# Patient Record
Sex: FEMALE | Race: WHITE | NOT HISPANIC OR LATINO | URBAN - METROPOLITAN AREA
[De-identification: names, ages, dates, MRNs, and addresses within clinical notes are randomized per-mention and may not be internally consistent; named-entity substitution may affect disease eponyms.]

---

## 2020-06-24 ENCOUNTER — INPATIENT (INPATIENT)
Facility: HOSPITAL | Age: 50
LOS: 2 days | Discharge: HOME | DRG: 392 | End: 2020-06-27
Attending: COLON & RECTAL SURGERY | Admitting: COLON & RECTAL SURGERY
Payer: COMMERCIAL

## 2020-06-24 VITALS — WEIGHT: 190.04 LBS | HEIGHT: 65 IN

## 2020-06-24 DIAGNOSIS — K57.20 DIVERTICULITIS OF LARGE INTESTINE WITH PERFORATION AND ABSCESS WITHOUT BLEEDING: ICD-10-CM

## 2020-06-24 LAB
ALBUMIN SERPL ELPH-MCNC: 3.1 G/DL — LOW (ref 3.3–5)
ALP SERPL-CCNC: 99 U/L — SIGNIFICANT CHANGE UP (ref 40–120)
ALT FLD-CCNC: 35 U/L — SIGNIFICANT CHANGE UP (ref 12–78)
ANION GAP SERPL CALC-SCNC: 8 MMOL/L — SIGNIFICANT CHANGE UP (ref 5–17)
APPEARANCE UR: CLEAR — SIGNIFICANT CHANGE UP
APTT BLD: 33.1 SEC — SIGNIFICANT CHANGE UP (ref 27.5–36.3)
AST SERPL-CCNC: 14 U/L — LOW (ref 15–37)
BASOPHILS # BLD AUTO: 0.05 K/UL — SIGNIFICANT CHANGE UP (ref 0–0.2)
BASOPHILS NFR BLD AUTO: 0.5 % — SIGNIFICANT CHANGE UP (ref 0–2)
BILIRUB SERPL-MCNC: 0.5 MG/DL — SIGNIFICANT CHANGE UP (ref 0.2–1.2)
BILIRUB UR-MCNC: NEGATIVE — SIGNIFICANT CHANGE UP
BUN SERPL-MCNC: 14 MG/DL — SIGNIFICANT CHANGE UP (ref 7–23)
CALCIUM SERPL-MCNC: 9.5 MG/DL — SIGNIFICANT CHANGE UP (ref 8.5–10.1)
CHLORIDE SERPL-SCNC: 105 MMOL/L — SIGNIFICANT CHANGE UP (ref 96–108)
CO2 SERPL-SCNC: 24 MMOL/L — SIGNIFICANT CHANGE UP (ref 22–31)
COLOR SPEC: YELLOW — SIGNIFICANT CHANGE UP
CREAT SERPL-MCNC: 0.62 MG/DL — SIGNIFICANT CHANGE UP (ref 0.5–1.3)
DIFF PNL FLD: ABNORMAL
EOSINOPHIL # BLD AUTO: 0.13 K/UL — SIGNIFICANT CHANGE UP (ref 0–0.5)
EOSINOPHIL NFR BLD AUTO: 1.3 % — SIGNIFICANT CHANGE UP (ref 0–6)
GLUCOSE SERPL-MCNC: 101 MG/DL — HIGH (ref 70–99)
GLUCOSE UR QL: NEGATIVE MG/DL — SIGNIFICANT CHANGE UP
HCT VFR BLD CALC: 36.3 % — SIGNIFICANT CHANGE UP (ref 34.5–45)
HGB BLD-MCNC: 12.2 G/DL — SIGNIFICANT CHANGE UP (ref 11.5–15.5)
IMM GRANULOCYTES NFR BLD AUTO: 0.4 % — SIGNIFICANT CHANGE UP (ref 0–1.5)
INR BLD: 1.19 RATIO — HIGH (ref 0.88–1.16)
KETONES UR-MCNC: ABNORMAL
LACTATE SERPL-SCNC: 0.9 MMOL/L — SIGNIFICANT CHANGE UP (ref 0.7–2)
LEUKOCYTE ESTERASE UR-ACNC: ABNORMAL
LYMPHOCYTES # BLD AUTO: 2.2 K/UL — SIGNIFICANT CHANGE UP (ref 1–3.3)
LYMPHOCYTES # BLD AUTO: 21.6 % — SIGNIFICANT CHANGE UP (ref 13–44)
MCHC RBC-ENTMCNC: 29.3 PG — SIGNIFICANT CHANGE UP (ref 27–34)
MCHC RBC-ENTMCNC: 33.6 GM/DL — SIGNIFICANT CHANGE UP (ref 32–36)
MCV RBC AUTO: 87.3 FL — SIGNIFICANT CHANGE UP (ref 80–100)
MONOCYTES # BLD AUTO: 0.73 K/UL — SIGNIFICANT CHANGE UP (ref 0–0.9)
MONOCYTES NFR BLD AUTO: 7.2 % — SIGNIFICANT CHANGE UP (ref 2–14)
NEUTROPHILS # BLD AUTO: 7.03 K/UL — SIGNIFICANT CHANGE UP (ref 1.8–7.4)
NEUTROPHILS NFR BLD AUTO: 69 % — SIGNIFICANT CHANGE UP (ref 43–77)
NITRITE UR-MCNC: NEGATIVE — SIGNIFICANT CHANGE UP
PH UR: 5 — SIGNIFICANT CHANGE UP (ref 5–8)
PLATELET # BLD AUTO: 314 K/UL — SIGNIFICANT CHANGE UP (ref 150–400)
POTASSIUM SERPL-MCNC: 3.6 MMOL/L — SIGNIFICANT CHANGE UP (ref 3.5–5.3)
POTASSIUM SERPL-SCNC: 3.6 MMOL/L — SIGNIFICANT CHANGE UP (ref 3.5–5.3)
PROT SERPL-MCNC: 8.5 GM/DL — HIGH (ref 6–8.3)
PROT UR-MCNC: 30 MG/DL
PROTHROM AB SERPL-ACNC: 13.3 SEC — HIGH (ref 10–12.9)
RBC # BLD: 4.16 M/UL — SIGNIFICANT CHANGE UP (ref 3.8–5.2)
RBC # FLD: 13.6 % — SIGNIFICANT CHANGE UP (ref 10.3–14.5)
SODIUM SERPL-SCNC: 137 MMOL/L — SIGNIFICANT CHANGE UP (ref 135–145)
SP GR SPEC: 1.02 — SIGNIFICANT CHANGE UP (ref 1.01–1.02)
UROBILINOGEN FLD QL: 4 MG/DL
WBC # BLD: 10.18 K/UL — SIGNIFICANT CHANGE UP (ref 3.8–10.5)
WBC # FLD AUTO: 10.18 K/UL — SIGNIFICANT CHANGE UP (ref 3.8–10.5)

## 2020-06-24 PROCEDURE — 84100 ASSAY OF PHOSPHORUS: CPT

## 2020-06-24 PROCEDURE — 36415 COLL VENOUS BLD VENIPUNCTURE: CPT

## 2020-06-24 PROCEDURE — 87635 SARS-COV-2 COVID-19 AMP PRB: CPT

## 2020-06-24 PROCEDURE — 93010 ELECTROCARDIOGRAM REPORT: CPT

## 2020-06-24 PROCEDURE — 71045 X-RAY EXAM CHEST 1 VIEW: CPT | Mod: 26

## 2020-06-24 PROCEDURE — 85730 THROMBOPLASTIN TIME PARTIAL: CPT

## 2020-06-24 PROCEDURE — 99222 1ST HOSP IP/OBS MODERATE 55: CPT | Mod: AI,GC

## 2020-06-24 PROCEDURE — 85610 PROTHROMBIN TIME: CPT

## 2020-06-24 PROCEDURE — 85027 COMPLETE CBC AUTOMATED: CPT

## 2020-06-24 PROCEDURE — 80048 BASIC METABOLIC PNL TOTAL CA: CPT

## 2020-06-24 PROCEDURE — 85025 COMPLETE CBC W/AUTO DIFF WBC: CPT

## 2020-06-24 PROCEDURE — 83735 ASSAY OF MAGNESIUM: CPT

## 2020-06-24 RX ORDER — HEPARIN SODIUM 5000 [USP'U]/ML
5000 INJECTION INTRAVENOUS; SUBCUTANEOUS EVERY 8 HOURS
Refills: 0 | Status: DISCONTINUED | OUTPATIENT
Start: 2020-06-24 | End: 2020-06-27

## 2020-06-24 RX ORDER — SODIUM CHLORIDE 9 MG/ML
1000 INJECTION, SOLUTION INTRAVENOUS
Refills: 0 | Status: DISCONTINUED | OUTPATIENT
Start: 2020-06-24 | End: 2020-06-26

## 2020-06-24 RX ORDER — ONDANSETRON 8 MG/1
4 TABLET, FILM COATED ORAL ONCE
Refills: 0 | Status: DISCONTINUED | OUTPATIENT
Start: 2020-06-24 | End: 2020-06-27

## 2020-06-24 RX ORDER — MORPHINE SULFATE 50 MG/1
2 CAPSULE, EXTENDED RELEASE ORAL EVERY 6 HOURS
Refills: 0 | Status: DISCONTINUED | OUTPATIENT
Start: 2020-06-24 | End: 2020-06-27

## 2020-06-24 RX ORDER — PIPERACILLIN AND TAZOBACTAM 4; .5 G/20ML; G/20ML
3.38 INJECTION, POWDER, LYOPHILIZED, FOR SOLUTION INTRAVENOUS ONCE
Refills: 0 | Status: COMPLETED | OUTPATIENT
Start: 2020-06-24 | End: 2020-06-24

## 2020-06-24 RX ORDER — SODIUM CHLORIDE 9 MG/ML
1000 INJECTION INTRAMUSCULAR; INTRAVENOUS; SUBCUTANEOUS ONCE
Refills: 0 | Status: COMPLETED | OUTPATIENT
Start: 2020-06-24 | End: 2020-06-24

## 2020-06-24 RX ORDER — PIPERACILLIN AND TAZOBACTAM 4; .5 G/20ML; G/20ML
3.38 INJECTION, POWDER, LYOPHILIZED, FOR SOLUTION INTRAVENOUS EVERY 8 HOURS
Refills: 0 | Status: DISCONTINUED | OUTPATIENT
Start: 2020-06-24 | End: 2020-06-27

## 2020-06-24 RX ORDER — KETOROLAC TROMETHAMINE 30 MG/ML
15 SYRINGE (ML) INJECTION ONCE
Refills: 0 | Status: DISCONTINUED | OUTPATIENT
Start: 2020-06-24 | End: 2020-06-27

## 2020-06-24 RX ADMIN — PIPERACILLIN AND TAZOBACTAM 200 GRAM(S): 4; .5 INJECTION, POWDER, LYOPHILIZED, FOR SOLUTION INTRAVENOUS at 21:18

## 2020-06-24 RX ADMIN — SODIUM CHLORIDE 1000 MILLILITER(S): 9 INJECTION INTRAMUSCULAR; INTRAVENOUS; SUBCUTANEOUS at 22:05

## 2020-06-24 NOTE — ED ADULT NURSE NOTE - OBJECTIVE STATEMENT
Patient presents with abdominal pain for the past few months, patient reports it has worsened over the past week. Patient denies fever reports + constipation. Patient states she was at [facility] and had imaging prior to being sent to ED for further evaluation. Patient states she was told she has sigmoid diverticulitis with abscess and fistula.

## 2020-06-24 NOTE — H&P ADULT - ATTENDING COMMENTS
Seen and examined.  First episode of diverticulitis. No prior colonoscopy.  Outpt CT imaging with findings of moderate to severe sigmoid diverticulitis 2.5cm  abscess with impingement of left posterior aspect of bladder wall. though no definitive fistula identified.  WBC 10.  Afebrile.  soft, tender LLQ, ND  Remainder of labwork reviewed  A/P - Sigmoid diverticulitis with pericolic abscess and impending CVF formation  Admit, NPO, IVF, Abx  Reassess abdominal exam in AM prior to diet advancement.

## 2020-06-24 NOTE — H&P ADULT - NSHPPHYSICALEXAM_GEN_ALL_CORE
PHYSICAL EXAM:  Constitutional: NAD, GCS: 15/15  AOX3  Eyes:  WNL  ENMT:  WNL  Neck:  WNL, non tender  Back: Non tender  Respiratory: CTABL  Cardiovascular:  S1+S2+0  Gastrointestinal: Soft,tender to palpation in left lower quadrant, no rgr  Genitourinary:  WNL  Extremities: NV intact  Vascular:  Intact  Neurological: No focal neurological deficit,  CN, motor and sensory system grossly intact.  Skin: WNL  Musculoskeletal: WNL  Psychiatric: Grossly WNL                          12.2   10.18 )-----------( 314      ( 24 Jun 2020 21:14 )             36.3     06-24    137  |  105  |  14  ----------------------------<  101<H>  3.6   |  24  |  0.62    Ca    9.5      24 Jun 2020 21:14    TPro  8.5<H>  /  Alb  3.1<L>  /  TBili  0.5  /  DBili  x   /  AST  14<L>  /  ALT  35  /  AlkPhos  99  06-24

## 2020-06-24 NOTE — ED STATDOCS - PROGRESS NOTE DETAILS
called surgery resident for colorectal consult, will come see pt  Lovely Javed PA-C Pt seen and examined by surgery resident, will admit to Dr. Marie England, pt agreeable to being admitted and plan of care, COVID swab sent  Lovely Javed PA-C

## 2020-06-24 NOTE — H&P ADULT - HISTORY OF PRESENT ILLNESS
Pt is a 49 y/o female with no past medical history who presents to the ED c/o LLQ abdominal pain. Pt reports intermittent episodes of pain has been going on for months no with pain for the past 6 days with minimal improvement.  Pt went to [facility] MD and medical arts PTA for imaging with +sigmoid diverticulitis with abscess formation and colovesical fistula. Pt also found to have UTI. Denies n/v/d/pneumoturia. No other complaints at this time. Allergies: Cipro. PCP: Dr. Mishra. Gynecology: Dr. Castro, no history of colonoscopy in past    Pmed: none  Psurg: Cholecystectomy, Partial hysterectomy (2012), Myomectomy (2010)  Social: Denies smoking, etoh, drug abuse

## 2020-06-24 NOTE — ED STATDOCS - OBJECTIVE STATEMENT
49 y/o female with a PMHx of cholecystectomy presents to the ED c/o LLQ abdominal pain x months, worsening x6 days. Pt reports intermittent episodes of pain has been going on for months with fever in the past episodes, no fever this episode. +constipation. Pt went to [facility] MD and medical arts PTA for imaging with +sigmoid diverticulitis with abscess formation and colovesical fistula. Denies n/v/d. No other complaints at this time. Allergies: Cipro. PCP: Dr. Mishra. Gynecology: Dr. Castro.

## 2020-06-24 NOTE — ED ADULT TRIAGE NOTE - CHIEF COMPLAINT QUOTE
Pt comes in for LLQ abdominal pain for 6 days. PT went to [facility] md and medical arts today for imaging. Results show diverticulitis. Denies n/v/d

## 2020-06-24 NOTE — H&P ADULT - ASSESSMENT
Pt is a 50 year old female with no pmed found to have acute diverticulitis, 2.5cm abscess and poss colorvascular fistula formation    -Admit to Dr. England, colorectal surgery service  -Diet: NPO  -IVF - LR@120  -IV abx - Zosyn   -Pain control prn  -Anti emetic prn  -serial abd exams  -Upload outside images in AM   -DVT/GI ppx    Discussed plan with Dr. England, colorectal surgery attending

## 2020-06-24 NOTE — ED ADULT NURSE NOTE - CHIEF COMPLAINT QUOTE
Pt comes in for LLQ abdominal pain for 6 days. PT went to [facility] md and medical arts today for imaging. Results show diverticulitis. Denies n/v/d

## 2020-06-24 NOTE — ED STATDOCS - ATTENDING CONTRIBUTION TO CARE
Attending Contribution to Care: I, Suni Harris, performed the initial face to face bedside interview with this patient regarding history of present illness, review of symptoms and relevant past medical, social and family history.  I completed an independent physical examination.  I was the initial provider who evaluated this patient and the history, physical, and MDM reflect this intial assessment. I have signed out the follow up of any pending tests after the original (i.e. labs, radiological studies) to the ACP with instructions to review any with instructions to review any concerning findings to me prior to discharge.  I have communicated the patient’s plan of care and disposition with the ACP.

## 2020-06-25 LAB
ANION GAP SERPL CALC-SCNC: 8 MMOL/L — SIGNIFICANT CHANGE UP (ref 5–17)
APTT BLD: 30.7 SEC — SIGNIFICANT CHANGE UP (ref 27.5–36.3)
BASOPHILS # BLD AUTO: 0.03 K/UL — SIGNIFICANT CHANGE UP (ref 0–0.2)
BASOPHILS NFR BLD AUTO: 0.4 % — SIGNIFICANT CHANGE UP (ref 0–2)
BUN SERPL-MCNC: 16 MG/DL — SIGNIFICANT CHANGE UP (ref 7–23)
CALCIUM SERPL-MCNC: 8.9 MG/DL — SIGNIFICANT CHANGE UP (ref 8.5–10.1)
CHLORIDE SERPL-SCNC: 106 MMOL/L — SIGNIFICANT CHANGE UP (ref 96–108)
CO2 SERPL-SCNC: 26 MMOL/L — SIGNIFICANT CHANGE UP (ref 22–31)
CREAT SERPL-MCNC: 0.49 MG/DL — LOW (ref 0.5–1.3)
CULTURE RESULTS: SIGNIFICANT CHANGE UP
EOSINOPHIL # BLD AUTO: 0.17 K/UL — SIGNIFICANT CHANGE UP (ref 0–0.5)
EOSINOPHIL NFR BLD AUTO: 2.1 % — SIGNIFICANT CHANGE UP (ref 0–6)
GLUCOSE SERPL-MCNC: 91 MG/DL — SIGNIFICANT CHANGE UP (ref 70–99)
HCT VFR BLD CALC: 32.5 % — LOW (ref 34.5–45)
HGB BLD-MCNC: 10.7 G/DL — LOW (ref 11.5–15.5)
IMM GRANULOCYTES NFR BLD AUTO: 0.2 % — SIGNIFICANT CHANGE UP (ref 0–1.5)
INR BLD: 1.17 RATIO — HIGH (ref 0.88–1.16)
LYMPHOCYTES # BLD AUTO: 2.08 K/UL — SIGNIFICANT CHANGE UP (ref 1–3.3)
LYMPHOCYTES # BLD AUTO: 25.2 % — SIGNIFICANT CHANGE UP (ref 13–44)
MAGNESIUM SERPL-MCNC: 2.3 MG/DL — SIGNIFICANT CHANGE UP (ref 1.6–2.6)
MCHC RBC-ENTMCNC: 28.7 PG — SIGNIFICANT CHANGE UP (ref 27–34)
MCHC RBC-ENTMCNC: 32.9 GM/DL — SIGNIFICANT CHANGE UP (ref 32–36)
MCV RBC AUTO: 87.1 FL — SIGNIFICANT CHANGE UP (ref 80–100)
MONOCYTES # BLD AUTO: 0.88 K/UL — SIGNIFICANT CHANGE UP (ref 0–0.9)
MONOCYTES NFR BLD AUTO: 10.6 % — SIGNIFICANT CHANGE UP (ref 2–14)
NEUTROPHILS # BLD AUTO: 5.09 K/UL — SIGNIFICANT CHANGE UP (ref 1.8–7.4)
NEUTROPHILS NFR BLD AUTO: 61.5 % — SIGNIFICANT CHANGE UP (ref 43–77)
PHOSPHATE SERPL-MCNC: 4.5 MG/DL — SIGNIFICANT CHANGE UP (ref 2.5–4.5)
PLATELET # BLD AUTO: 307 K/UL — SIGNIFICANT CHANGE UP (ref 150–400)
POTASSIUM SERPL-MCNC: 3.6 MMOL/L — SIGNIFICANT CHANGE UP (ref 3.5–5.3)
POTASSIUM SERPL-SCNC: 3.6 MMOL/L — SIGNIFICANT CHANGE UP (ref 3.5–5.3)
PROTHROM AB SERPL-ACNC: 13 SEC — HIGH (ref 10–12.9)
RBC # BLD: 3.73 M/UL — LOW (ref 3.8–5.2)
RBC # FLD: 13.5 % — SIGNIFICANT CHANGE UP (ref 10.3–14.5)
SARS-COV-2 RNA SPEC QL NAA+PROBE: SIGNIFICANT CHANGE UP
SODIUM SERPL-SCNC: 140 MMOL/L — SIGNIFICANT CHANGE UP (ref 135–145)
SPECIMEN SOURCE: SIGNIFICANT CHANGE UP
WBC # BLD: 8.27 K/UL — SIGNIFICANT CHANGE UP (ref 3.8–10.5)
WBC # FLD AUTO: 8.27 K/UL — SIGNIFICANT CHANGE UP (ref 3.8–10.5)

## 2020-06-25 PROCEDURE — 99232 SBSQ HOSP IP/OBS MODERATE 35: CPT | Mod: GC

## 2020-06-25 RX ADMIN — PIPERACILLIN AND TAZOBACTAM 25 GRAM(S): 4; .5 INJECTION, POWDER, LYOPHILIZED, FOR SOLUTION INTRAVENOUS at 05:47

## 2020-06-25 RX ADMIN — SODIUM CHLORIDE 120 MILLILITER(S): 9 INJECTION, SOLUTION INTRAVENOUS at 01:32

## 2020-06-25 RX ADMIN — PIPERACILLIN AND TAZOBACTAM 25 GRAM(S): 4; .5 INJECTION, POWDER, LYOPHILIZED, FOR SOLUTION INTRAVENOUS at 13:09

## 2020-06-25 RX ADMIN — PIPERACILLIN AND TAZOBACTAM 25 GRAM(S): 4; .5 INJECTION, POWDER, LYOPHILIZED, FOR SOLUTION INTRAVENOUS at 21:23

## 2020-06-25 NOTE — PROVIDER CONTACT NOTE (OTHER) - SITUATION
notified Dr Mishra's office of admission please fax over d/c paperwork once pt is d/c to [phone]

## 2020-06-25 NOTE — PROGRESS NOTE ADULT - SUBJECTIVE AND OBJECTIVE BOX
Patient seen and examined at the bedside with surgery team. Resting in bed comfortably in no acute distress. Vitals stable. pain much better.                           10.7   8.27  )-----------( 307      ( 25 Jun 2020 07:21 )             32.5     06-25    140  |  106  |  16  ----------------------------<  91  3.6   |  26  |  0.49<L>    Ca    8.9      25 Jun 2020 07:21  Phos  4.5     06-25  Mg     2.3     06-25    TPro  8.5<H>  /  Alb  3.1<L>  /  TBili  0.5  /  DBili  x   /  AST  14<L>  /  ALT  35  /  AlkPhos  99  06-24      ICU Vital Signs Last 24 Hrs  T(C): 36.8 (25 Jun 2020 05:28), Max: 37.1 (25 Jun 2020 00:17)  T(F): 98.3 (25 Jun 2020 05:28), Max: 98.7 (25 Jun 2020 00:17)  HR: 72 (25 Jun 2020 05:28) (72 - 96)  BP: 105/65 (25 Jun 2020 05:28) (104/64 - 123/76)  BP(mean): 90 (24 Jun 2020 20:33) (90 - 90)  ABP: --  ABP(mean): --  RR: 18 (25 Jun 2020 05:28) (18 - 18)  SpO2: 95% (25 Jun 2020 05:28) (94% - 97%)      physical exam:     gen: aoo x3  pulm : equal air entry bilaterally  cv: [id number]  abdomen : soft, nontender,

## 2020-06-25 NOTE — ED ADULT NURSE REASSESSMENT NOTE - NS ED NURSE REASSESS COMMENT FT1
Patient received from MIRNA Arevalo at 3670.  Patient resting comfortably at this time.  Patient reports that she was able to move her bowels.  Patient states pain level is 3-4/10 at present she denies the need for pain medication at this time.  Color pink, skin warm dry.  Right IV site is clean, dry and intact.  Patient resting comfortably with no complaints.  Stat nasal swab done at this time.  Report to be given.

## 2020-06-25 NOTE — PROGRESS NOTE ADULT - ASSESSMENT
first episode diverticulitis  pain control  monitor vitals  serial abdominal exam  trial of clear liquids   gi and dvt ppx   continue iv abx

## 2020-06-26 LAB
ANION GAP SERPL CALC-SCNC: 3 MMOL/L — LOW (ref 5–17)
BUN SERPL-MCNC: 8 MG/DL — SIGNIFICANT CHANGE UP (ref 7–23)
CALCIUM SERPL-MCNC: 8.8 MG/DL — SIGNIFICANT CHANGE UP (ref 8.5–10.1)
CHLORIDE SERPL-SCNC: 109 MMOL/L — HIGH (ref 96–108)
CO2 SERPL-SCNC: 29 MMOL/L — SIGNIFICANT CHANGE UP (ref 22–31)
CREAT SERPL-MCNC: 0.59 MG/DL — SIGNIFICANT CHANGE UP (ref 0.5–1.3)
GLUCOSE SERPL-MCNC: 103 MG/DL — HIGH (ref 70–99)
HCT VFR BLD CALC: 32.3 % — LOW (ref 34.5–45)
HGB BLD-MCNC: 10.6 G/DL — LOW (ref 11.5–15.5)
MAGNESIUM SERPL-MCNC: 2.2 MG/DL — SIGNIFICANT CHANGE UP (ref 1.6–2.6)
MCHC RBC-ENTMCNC: 29.1 PG — SIGNIFICANT CHANGE UP (ref 27–34)
MCHC RBC-ENTMCNC: 32.8 GM/DL — SIGNIFICANT CHANGE UP (ref 32–36)
MCV RBC AUTO: 88.7 FL — SIGNIFICANT CHANGE UP (ref 80–100)
PHOSPHATE SERPL-MCNC: 3.4 MG/DL — SIGNIFICANT CHANGE UP (ref 2.5–4.5)
PLATELET # BLD AUTO: 296 K/UL — SIGNIFICANT CHANGE UP (ref 150–400)
POTASSIUM SERPL-MCNC: 3.8 MMOL/L — SIGNIFICANT CHANGE UP (ref 3.5–5.3)
POTASSIUM SERPL-SCNC: 3.8 MMOL/L — SIGNIFICANT CHANGE UP (ref 3.5–5.3)
RBC # BLD: 3.64 M/UL — LOW (ref 3.8–5.2)
RBC # FLD: 13.3 % — SIGNIFICANT CHANGE UP (ref 10.3–14.5)
SODIUM SERPL-SCNC: 141 MMOL/L — SIGNIFICANT CHANGE UP (ref 135–145)
WBC # BLD: 7.42 K/UL — SIGNIFICANT CHANGE UP (ref 3.8–10.5)
WBC # FLD AUTO: 7.42 K/UL — SIGNIFICANT CHANGE UP (ref 3.8–10.5)

## 2020-06-26 PROCEDURE — 99232 SBSQ HOSP IP/OBS MODERATE 35: CPT

## 2020-06-26 RX ORDER — SODIUM CHLORIDE 9 MG/ML
1000 INJECTION, SOLUTION INTRAVENOUS
Refills: 0 | Status: DISCONTINUED | OUTPATIENT
Start: 2020-06-26 | End: 2020-06-27

## 2020-06-26 RX ADMIN — PIPERACILLIN AND TAZOBACTAM 25 GRAM(S): 4; .5 INJECTION, POWDER, LYOPHILIZED, FOR SOLUTION INTRAVENOUS at 21:38

## 2020-06-26 RX ADMIN — SODIUM CHLORIDE 50 MILLILITER(S): 9 INJECTION, SOLUTION INTRAVENOUS at 11:01

## 2020-06-26 RX ADMIN — PIPERACILLIN AND TAZOBACTAM 25 GRAM(S): 4; .5 INJECTION, POWDER, LYOPHILIZED, FOR SOLUTION INTRAVENOUS at 05:45

## 2020-06-26 RX ADMIN — PIPERACILLIN AND TAZOBACTAM 25 GRAM(S): 4; .5 INJECTION, POWDER, LYOPHILIZED, FOR SOLUTION INTRAVENOUS at 12:25

## 2020-06-26 RX ADMIN — SODIUM CHLORIDE 120 MILLILITER(S): 9 INJECTION, SOLUTION INTRAVENOUS at 04:11

## 2020-06-26 NOTE — PROGRESS NOTE ADULT - ATTENDING COMMENTS
Pt presents to the ER with fall at 7pm in parking lot. Patient states that she was walking fast and fell and hit head on the ground. Patient has bruising and swelling to right face. Has pain to right knee. Rates pain 4/10, did not take anything for pain. Denies wanting to take anything for pain.  
Seen and examined.  Pain improved this morning.  Passing gas and stool.  AFVSS  NAD  soft, NTND  Labs reviewed  A/P - Sigmoid diverticulitis with abscess and involvement of bladder but no e/o CVF  Ok for clear liquid diet.  Cont abx.  Discussed need to pursue colonoscopy as output to r/o malignancy as source of perforation.  Advised elective surgical resection after colonoscopy.
Seen and examined.  Thaddeus clear liquid diet. Passing gas and small amounts of stool.  No reports of abd pain.  AFVSS  NAD  soft, NTND  WBC normal  A/P - Sigmoid diverticulitis with pericolic abscess, questionable involvement of bladder but no obvious CVF  Advance to full liquid diet.  Cont IV zosyn.  Anticipate advancement of diet tomorrow with potential discharge.  Discussed with pt that given clinical stability and improvement, will not repeat a CT A/P prior to d/c.  She understands and is agreeable with POC.

## 2020-06-26 NOTE — PROGRESS NOTE ADULT - SUBJECTIVE AND OBJECTIVE BOX
ICU Vital Signs Last 24 Hrs  T(C): 37.1 (25 Jun 2020 23:35), Max: 37.1 (25 Jun 2020 23:35)  T(F): 98.7 (25 Jun 2020 23:35), Max: 98.7 (25 Jun 2020 23:35)  HR: 62 (25 Jun 2020 23:35) (62 - 71)  BP: 117/66 (25 Jun 2020 23:35) (116/60 - 117/66)  BP(mean): --  ABP: --  ABP(mean): --  RR: 18 (25 Jun 2020 23:35) (18 - 18)  SpO2: 98% (25 Jun 2020 23:35) (98% - 99%)  Patient seen and examined at the bedside with surgery team. Resting in bed comfortably in no acute distress. Vitals stable. pain much better.                                      10.6   7.42  )-----------( 296      ( 26 Jun 2020 07:51 )             32.3     06-26    141  |  109<H>  |  8   ----------------------------<  103<H>  3.8   |  29  |  0.59    Ca    8.8      26 Jun 2020 07:51  Phos  3.4     06-26  Mg     2.2     06-26    TPro  8.5<H>  /  Alb  3.1<L>  /  TBili  0.5  /  DBili  x   /  AST  14<L>  /  ALT  35  /  AlkPhos  99  06-24              physical exam:     gen: aoo x3  pulm : equal air entry bilaterally  cv: [id number]  abdomen : soft, nontender,

## 2020-06-26 NOTE — PROGRESS NOTE ADULT - ASSESSMENT
first episode diverticulitis  pain control  monitor vitals  serial abdominal exam  advance to full liquids   gi and dvt ppx   continue iv abx

## 2020-06-27 ENCOUNTER — TRANSCRIPTION ENCOUNTER (OUTPATIENT)
Age: 50
End: 2020-06-27

## 2020-06-27 VITALS
SYSTOLIC BLOOD PRESSURE: 116 MMHG | TEMPERATURE: 99 F | HEART RATE: 53 BPM | RESPIRATION RATE: 17 BRPM | DIASTOLIC BLOOD PRESSURE: 61 MMHG | OXYGEN SATURATION: 94 %

## 2020-06-27 LAB
ANION GAP SERPL CALC-SCNC: 8 MMOL/L — SIGNIFICANT CHANGE UP (ref 5–17)
BUN SERPL-MCNC: 5 MG/DL — LOW (ref 7–23)
CALCIUM SERPL-MCNC: 9 MG/DL — SIGNIFICANT CHANGE UP (ref 8.5–10.1)
CHLORIDE SERPL-SCNC: 107 MMOL/L — SIGNIFICANT CHANGE UP (ref 96–108)
CO2 SERPL-SCNC: 26 MMOL/L — SIGNIFICANT CHANGE UP (ref 22–31)
CREAT SERPL-MCNC: 0.65 MG/DL — SIGNIFICANT CHANGE UP (ref 0.5–1.3)
GLUCOSE SERPL-MCNC: 122 MG/DL — HIGH (ref 70–99)
HCT VFR BLD CALC: 33.6 % — LOW (ref 34.5–45)
HGB BLD-MCNC: 11.2 G/DL — LOW (ref 11.5–15.5)
MAGNESIUM SERPL-MCNC: 2.2 MG/DL — SIGNIFICANT CHANGE UP (ref 1.6–2.6)
MCHC RBC-ENTMCNC: 29.4 PG — SIGNIFICANT CHANGE UP (ref 27–34)
MCHC RBC-ENTMCNC: 33.3 GM/DL — SIGNIFICANT CHANGE UP (ref 32–36)
MCV RBC AUTO: 88.2 FL — SIGNIFICANT CHANGE UP (ref 80–100)
PHOSPHATE SERPL-MCNC: 3.8 MG/DL — SIGNIFICANT CHANGE UP (ref 2.5–4.5)
PLATELET # BLD AUTO: 342 K/UL — SIGNIFICANT CHANGE UP (ref 150–400)
POTASSIUM SERPL-MCNC: 3.8 MMOL/L — SIGNIFICANT CHANGE UP (ref 3.5–5.3)
POTASSIUM SERPL-SCNC: 3.8 MMOL/L — SIGNIFICANT CHANGE UP (ref 3.5–5.3)
RBC # BLD: 3.81 M/UL — SIGNIFICANT CHANGE UP (ref 3.8–5.2)
RBC # FLD: 13.5 % — SIGNIFICANT CHANGE UP (ref 10.3–14.5)
SODIUM SERPL-SCNC: 141 MMOL/L — SIGNIFICANT CHANGE UP (ref 135–145)
WBC # BLD: 7.93 K/UL — SIGNIFICANT CHANGE UP (ref 3.8–10.5)
WBC # FLD AUTO: 7.93 K/UL — SIGNIFICANT CHANGE UP (ref 3.8–10.5)

## 2020-06-27 PROCEDURE — 99231 SBSQ HOSP IP/OBS SF/LOW 25: CPT

## 2020-06-27 RX ADMIN — SODIUM CHLORIDE 50 MILLILITER(S): 9 INJECTION, SOLUTION INTRAVENOUS at 02:37

## 2020-06-27 RX ADMIN — PIPERACILLIN AND TAZOBACTAM 25 GRAM(S): 4; .5 INJECTION, POWDER, LYOPHILIZED, FOR SOLUTION INTRAVENOUS at 05:18

## 2020-06-27 RX ADMIN — PIPERACILLIN AND TAZOBACTAM 25 GRAM(S): 4; .5 INJECTION, POWDER, LYOPHILIZED, FOR SOLUTION INTRAVENOUS at 14:38

## 2020-06-27 NOTE — DISCHARGE NOTE NURSING/CASE MANAGEMENT/SOCIAL WORK - PATIENT PORTAL LINK FT
You can access the FollowMyHealth Patient Portal offered by [facility] by registering at the following website: [email or web address]. By joining [employer]’s FollowMyHealth portal, you will also be able to view your health information using other applications (apps) compatible with our system.

## 2020-06-27 NOTE — DISCHARGE NOTE PROVIDER - HOSPITAL COURSE
Pt is a 49 y/o female with no past medical history who presents to the ED c/o LLQ abdominal pain. Pt reports intermittent episodes of pain has been going on for months no with pain for the past 6 days with minimal improvement.  Pt went to [facility] MD and medical arts PTA for imaging with +sigmoid diverticulitis with abscess formation and colovesical fistula. Pt also found to have UTI. Denies n/v/d/pneumoturia. No other complaints at this time. Allergies: Cipro. PCP: Dr. Mishra. Gynecology: Dr. Castro, no history of colonoscopy in past        Pt was treated conservatively with bowel rest and IV antibiotics. Pt improved and diet was advanved. Pt was able to tolerate a diet with minimal to no discomfort.

## 2020-06-27 NOTE — DISCHARGE NOTE PROVIDER - CARE PROVIDER_API CALL
Marie England  COLON/RECTAL SURGERY  [address] [place] [address]  Phone: [phone]  Fax: [phone]  Follow Up Time: 2 weeks

## 2020-06-27 NOTE — DISCHARGE NOTE PROVIDER - NSDCCPCAREPLAN_GEN_ALL_CORE_FT
PRINCIPAL DISCHARGE DIAGNOSIS  Diagnosis: Diverticulitis of large intestine with abscess without bleeding  Assessment and Plan of Treatment:

## 2020-06-27 NOTE — PROGRESS NOTE ADULT - SUBJECTIVE AND OBJECTIVE BOX
No issues overnight. Has no abdominal pain, fevers or chills. Had issues with oatmeal yesterday and tried cream of wheat this morning. Having bowel function    Exam:  Vital Signs Last 24 Hrs  T(C): 37.1 (27 Jun 2020 08:51), Max: 37.3 (26 Jun 2020 21:30)  T(F): 98.7 (27 Jun 2020 08:51), Max: 99.1 (26 Jun 2020 21:30)  HR: 71 (27 Jun 2020 08:51) (63 - 71)  BP: 114/74 (27 Jun 2020 08:51) (109/58 - 122/72)  RR: 17 (27 Jun 2020 08:51) (17 - 18)  SpO2: 97% (27 Jun 2020 08:51) (95% - 100%)      In no distress  Non labored breathing  Abdomen  soft, non tender, non distended.   Alert and oriented x 3                          11.2   7.93  )-----------( 342      ( 27 Jun 2020 09:01 )             33.6     06-27    141  |  107  |  5<L>  ----------------------------<  122<H>  3.8   |  26  |  0.65    Ca    9.0      27 Jun 2020 09:01  Phos  3.8     06-27  Mg     2.2     06-27

## 2020-06-27 NOTE — PROGRESS NOTE ADULT - ASSESSMENT
50 year old female with complicated diverticulitis who is improved. Advanced to solid diet today and plan for discharge home with oral antibiotics. She would follow up with Dr. England in the office and needs a colonoscopy in 6-8 weeks.

## 2020-06-30 LAB
CULTURE RESULTS: SIGNIFICANT CHANGE UP
CULTURE RESULTS: SIGNIFICANT CHANGE UP
SPECIMEN SOURCE: SIGNIFICANT CHANGE UP
SPECIMEN SOURCE: SIGNIFICANT CHANGE UP

## 2020-07-01 DIAGNOSIS — Z79.2 LONG TERM (CURRENT) USE OF ANTIBIOTICS: ICD-10-CM

## 2020-07-01 DIAGNOSIS — R10.9 UNSPECIFIED ABDOMINAL PAIN: ICD-10-CM

## 2020-07-01 DIAGNOSIS — Z90.710 ACQUIRED ABSENCE OF BOTH CERVIX AND UTERUS: ICD-10-CM

## 2020-07-01 DIAGNOSIS — Z79.899 OTHER LONG TERM (CURRENT) DRUG THERAPY: ICD-10-CM

## 2020-07-01 DIAGNOSIS — K57.20 DIVERTICULITIS OF LARGE INTESTINE WITH PERFORATION AND ABSCESS WITHOUT BLEEDING: ICD-10-CM

## 2020-07-01 DIAGNOSIS — Z90.49 ACQUIRED ABSENCE OF OTHER SPECIFIED PARTS OF DIGESTIVE TRACT: ICD-10-CM

## 2020-07-01 DIAGNOSIS — K59.00 CONSTIPATION, UNSPECIFIED: ICD-10-CM

## 2020-07-01 DIAGNOSIS — N39.0 URINARY TRACT INFECTION, SITE NOT SPECIFIED: ICD-10-CM

## 2020-07-07 PROBLEM — Z00.00 ENCOUNTER FOR PREVENTIVE HEALTH EXAMINATION: Status: ACTIVE | Noted: 2020-07-07

## 2020-07-13 ENCOUNTER — APPOINTMENT (OUTPATIENT)
Dept: COLORECTAL SURGERY | Facility: CLINIC | Age: 50
End: 2020-07-13
Payer: COMMERCIAL

## 2020-07-13 VITALS
HEIGHT: 65 IN | TEMPERATURE: 98 F | SYSTOLIC BLOOD PRESSURE: 114 MMHG | DIASTOLIC BLOOD PRESSURE: 78 MMHG | HEART RATE: 81 BPM | WEIGHT: 180 LBS | BODY MASS INDEX: 29.99 KG/M2

## 2020-07-13 DIAGNOSIS — Z86.59 PERSONAL HISTORY OF OTHER MENTAL AND BEHAVIORAL DISORDERS: ICD-10-CM

## 2020-07-13 DIAGNOSIS — Z87.19 PERSONAL HISTORY OF OTHER DISEASES OF THE DIGESTIVE SYSTEM: ICD-10-CM

## 2020-07-13 DIAGNOSIS — R73.03 PREDIABETES.: ICD-10-CM

## 2020-07-13 DIAGNOSIS — F17.200 NICOTINE DEPENDENCE, UNSPECIFIED, UNCOMPLICATED: ICD-10-CM

## 2020-07-13 PROCEDURE — 99214 OFFICE O/P EST MOD 30 MIN: CPT

## 2020-07-13 NOTE — HISTORY OF PRESENT ILLNESS
[FreeTextEntry1] : Ms. Neves presents to the office for follow-up from her recent hospitalization 6/24/20 - 6/27/20 For sigmoid diverticulitis with associated abscess and impending colovesical fistula formation.\par \par Since her hospital discharge, she has completed her course of Augmentin.  She does not have much of an appetite but has no issues with nausea and vomiting after eating.  She does note some difficulties with passing stools which is unusual for her as she typically passes stools once daily.  She also notes some occasional left lower quadrant twinges of discomfort but denies significant abdominal pain such as what brought her to the hospital initially.  She also denies any fevers or chills.

## 2020-07-13 NOTE — PHYSICAL EXAM
[No Rash or Lesion] : No rash or lesion [Alert] : alert [Oriented to Person] : oriented to person [Oriented to Place] : oriented to place [Calm] : calm [Oriented to Time] : oriented to time [de-identified] : No apparent distress [de-identified] : Soft, nontender and nondistended [de-identified] : Normocephalic atraumatic [de-identified] : Moving all extremities x4

## 2020-07-13 NOTE — REASON FOR VISIT
[Post Hospitalization] : a post hospitalization visit [FreeTextEntry1] : 6/24/20 - 6/27/20 For sigmoid diverticulitis with associated abscess and impending colovesical fistula formation

## 2020-07-13 NOTE — ASSESSMENT
[FreeTextEntry1] : Ms. Melissa moy presents to the office for follow-up after recent hospitalization at [facility] for diverticulitis with abscess formation and colovesical fistula.  Since her hospital discharge, she is feeling overall improved though continues to have mild left lower quadrant discomfort.  She is also experiencing some constipation.\par \par I have advised her to return to a high-fiber diet, with ample water intake and to add MiraLAX nightly to her bowel regimen.\par \par With regards to her left lower quadrant discomfort, we will obtain a CT A/P to ensure resolution of the abscess.\par \par If CT is negative, we plan to proceed with screening colonoscopy.\par The risks/benefits/alternatives for a colonoscopy were discussed. These include a less than 1% risk of bleeding should any polyps be biopsied and/or removed. There is also a less than 0.1% risk of perforation. The patient understands the need to adhere to a clear liquid diet the day prior to procedure as well as having to perform a bowel prep in order to allow for adequate visualization of the mucosal surfaces.  Followup colonoscopies will be scheduled based on the findings that are seen at the time of the procedure. Patient understands and is agreeable, and will proceed with consent and scheduling.\par \par If CT demonstrates persistent or worsening disease, I will notify her of this result and we will need to change our above plans to possibly proceed with surgery sooner than expected without colonoscopy versus using IV antibiotics as a bridge to elective resection.\par \par She understands, and is agreeable.

## 2020-07-18 ENCOUNTER — APPOINTMENT (OUTPATIENT)
Dept: CT IMAGING | Facility: CLINIC | Age: 50
End: 2020-07-18
Payer: COMMERCIAL

## 2020-07-18 ENCOUNTER — OUTPATIENT (OUTPATIENT)
Dept: OUTPATIENT SERVICES | Facility: HOSPITAL | Age: 50
LOS: 1 days | End: 2020-07-18

## 2020-07-18 DIAGNOSIS — Z00.8 ENCOUNTER FOR OTHER GENERAL EXAMINATION: ICD-10-CM

## 2020-07-18 PROCEDURE — 74177 CT ABD & PELVIS W/CONTRAST: CPT | Mod: 26

## 2020-07-22 ENCOUNTER — TRANSCRIPTION ENCOUNTER (OUTPATIENT)
Age: 50
End: 2020-07-22

## 2020-08-09 ENCOUNTER — OUTPATIENT (OUTPATIENT)
Dept: OUTPATIENT SERVICES | Facility: HOSPITAL | Age: 50
LOS: 1 days | End: 2020-08-09
Payer: COMMERCIAL

## 2020-08-09 DIAGNOSIS — Z11.59 ENCOUNTER FOR SCREENING FOR OTHER VIRAL DISEASES: ICD-10-CM

## 2020-08-09 LAB — SARS-COV-2 RNA SPEC QL NAA+PROBE: SIGNIFICANT CHANGE UP

## 2020-08-09 PROCEDURE — U0003: CPT

## 2020-08-10 ENCOUNTER — OUTPATIENT (OUTPATIENT)
Dept: OUTPATIENT SERVICES | Facility: HOSPITAL | Age: 50
LOS: 1 days | End: 2020-08-10
Payer: COMMERCIAL

## 2020-08-10 VITALS
DIASTOLIC BLOOD PRESSURE: 79 MMHG | HEART RATE: 82 BPM | WEIGHT: 173.94 LBS | HEIGHT: 65 IN | TEMPERATURE: 98 F | OXYGEN SATURATION: 99 % | RESPIRATION RATE: 16 BRPM | SYSTOLIC BLOOD PRESSURE: 114 MMHG

## 2020-08-10 DIAGNOSIS — Z11.59 ENCOUNTER FOR SCREENING FOR OTHER VIRAL DISEASES: ICD-10-CM

## 2020-08-10 DIAGNOSIS — K57.80 DIVERTICULITIS OF INTESTINE, PART UNSPECIFIED, WITH PERFORATION AND ABSCESS WITHOUT BLEEDING: ICD-10-CM

## 2020-08-10 DIAGNOSIS — Z90.710 ACQUIRED ABSENCE OF BOTH CERVIX AND UTERUS: Chronic | ICD-10-CM

## 2020-08-10 DIAGNOSIS — Z01.818 ENCOUNTER FOR OTHER PREPROCEDURAL EXAMINATION: ICD-10-CM

## 2020-08-10 DIAGNOSIS — Z90.49 ACQUIRED ABSENCE OF OTHER SPECIFIED PARTS OF DIGESTIVE TRACT: Chronic | ICD-10-CM

## 2020-08-10 DIAGNOSIS — Z98.890 OTHER SPECIFIED POSTPROCEDURAL STATES: Chronic | ICD-10-CM

## 2020-08-10 LAB
ANION GAP SERPL CALC-SCNC: 6 MMOL/L — SIGNIFICANT CHANGE UP (ref 5–17)
APTT BLD: 34.1 SEC — SIGNIFICANT CHANGE UP (ref 27.5–35.5)
BASOPHILS # BLD AUTO: 0.05 K/UL — SIGNIFICANT CHANGE UP (ref 0–0.2)
BASOPHILS NFR BLD AUTO: 0.6 % — SIGNIFICANT CHANGE UP (ref 0–2)
BUN SERPL-MCNC: 16 MG/DL — SIGNIFICANT CHANGE UP (ref 7–23)
CALCIUM SERPL-MCNC: 9.4 MG/DL — SIGNIFICANT CHANGE UP (ref 8.5–10.1)
CHLORIDE SERPL-SCNC: 106 MMOL/L — SIGNIFICANT CHANGE UP (ref 96–108)
CO2 SERPL-SCNC: 26 MMOL/L — SIGNIFICANT CHANGE UP (ref 22–31)
CREAT SERPL-MCNC: 0.62 MG/DL — SIGNIFICANT CHANGE UP (ref 0.5–1.3)
EOSINOPHIL # BLD AUTO: 0.16 K/UL — SIGNIFICANT CHANGE UP (ref 0–0.5)
EOSINOPHIL NFR BLD AUTO: 1.9 % — SIGNIFICANT CHANGE UP (ref 0–6)
GLUCOSE SERPL-MCNC: 90 MG/DL — SIGNIFICANT CHANGE UP (ref 70–99)
HCT VFR BLD CALC: 40.9 % — SIGNIFICANT CHANGE UP (ref 34.5–45)
HGB BLD-MCNC: 13.4 G/DL — SIGNIFICANT CHANGE UP (ref 11.5–15.5)
IMM GRANULOCYTES NFR BLD AUTO: 0.2 % — SIGNIFICANT CHANGE UP (ref 0–1.5)
INR BLD: 1.08 RATIO — SIGNIFICANT CHANGE UP (ref 0.88–1.16)
LYMPHOCYTES # BLD AUTO: 2.56 K/UL — SIGNIFICANT CHANGE UP (ref 1–3.3)
LYMPHOCYTES # BLD AUTO: 29.7 % — SIGNIFICANT CHANGE UP (ref 13–44)
MCHC RBC-ENTMCNC: 29 PG — SIGNIFICANT CHANGE UP (ref 27–34)
MCHC RBC-ENTMCNC: 32.8 GM/DL — SIGNIFICANT CHANGE UP (ref 32–36)
MCV RBC AUTO: 88.5 FL — SIGNIFICANT CHANGE UP (ref 80–100)
MONOCYTES # BLD AUTO: 0.61 K/UL — SIGNIFICANT CHANGE UP (ref 0–0.9)
MONOCYTES NFR BLD AUTO: 7.1 % — SIGNIFICANT CHANGE UP (ref 2–14)
NEUTROPHILS # BLD AUTO: 5.23 K/UL — SIGNIFICANT CHANGE UP (ref 1.8–7.4)
NEUTROPHILS NFR BLD AUTO: 60.5 % — SIGNIFICANT CHANGE UP (ref 43–77)
PLATELET # BLD AUTO: 316 K/UL — SIGNIFICANT CHANGE UP (ref 150–400)
POTASSIUM SERPL-MCNC: 4.3 MMOL/L — SIGNIFICANT CHANGE UP (ref 3.5–5.3)
POTASSIUM SERPL-SCNC: 4.3 MMOL/L — SIGNIFICANT CHANGE UP (ref 3.5–5.3)
PROTHROM AB SERPL-ACNC: 12.5 SEC — SIGNIFICANT CHANGE UP (ref 10.6–13.6)
RBC # BLD: 4.62 M/UL — SIGNIFICANT CHANGE UP (ref 3.8–5.2)
RBC # FLD: 14.4 % — SIGNIFICANT CHANGE UP (ref 10.3–14.5)
SODIUM SERPL-SCNC: 138 MMOL/L — SIGNIFICANT CHANGE UP (ref 135–145)
WBC # BLD: 8.63 K/UL — SIGNIFICANT CHANGE UP (ref 3.8–10.5)
WBC # FLD AUTO: 8.63 K/UL — SIGNIFICANT CHANGE UP (ref 3.8–10.5)

## 2020-08-10 PROCEDURE — 85730 THROMBOPLASTIN TIME PARTIAL: CPT

## 2020-08-10 PROCEDURE — 80048 BASIC METABOLIC PNL TOTAL CA: CPT

## 2020-08-10 PROCEDURE — 36415 COLL VENOUS BLD VENIPUNCTURE: CPT

## 2020-08-10 PROCEDURE — G0463: CPT | Mod: 25

## 2020-08-10 PROCEDURE — 85610 PROTHROMBIN TIME: CPT

## 2020-08-10 PROCEDURE — 85025 COMPLETE CBC W/AUTO DIFF WBC: CPT

## 2020-08-10 NOTE — H&P PST ADULT - NSICDXPASTSURGICALHX_GEN_ALL_CORE_FT
PAST SURGICAL HISTORY:  H/O myomectomy 2011    H/O: hysterectomy due to fibroids 7/2013    History of cholecystectomy 2000

## 2020-08-10 NOTE — H&P PST ADULT - NSICDXPASTMEDICALHX_GEN_ALL_CORE_FT
PAST MEDICAL HISTORY:  Arthritis spine    Diverticulitis     History of anxiety     Seasonal allergies

## 2020-08-10 NOTE — H&P PST ADULT - HISTORY OF PRESENT ILLNESS
50 year old female with diverticulitis; Pt was in  June 2020 for abdominal pain CT scan showed diverticular abscess she was on Augmentin x 7 days; currently denies LLQ pain ; she presnets to PST for planned colonoscopy

## 2020-08-10 NOTE — H&P PST ADULT - ASSESSMENT
50 year old female presnets to PST for planned colonoscopy  1.  Dr England  office  will instruct patient regarding bowel prep and  medication regimen on day of procedure.  2. Endoscopy booking will call patient the day before surgery for arrival instructions   3. NPO post midnight  4. CBC BMP EKG as per Dr England   5. Patient instructed to have responsible adult accompany/ drive pt home after procedure

## 2020-08-10 NOTE — H&P PST ADULT - NSANTHOSAYNRD_GEN_A_CORE
No. TREVER screening performed.  STOP BANG Legend: 0-2 = LOW Risk; 3-4 = INTERMEDIATE Risk; 5-8 = HIGH Risk

## 2020-08-10 NOTE — H&P PST ADULT - TEACHING/LEARNING RELIGIOUS CONSIDERATIONS
"Subjective:       Patient ID: Rebecca Blankenship is a 27 y.o. female.    Chief Complaint: Diarrhea; Chills; and Cough      HPI    Rebecca is here today with c/o illness x 4 days.  Reports sweats, chills, loose stools, and aches.  Reports RN, NC, HA and sore throat.  Cough has been productive of yellow phlegm at times.  OTC meds not helping.      Review of Systems   Constitutional: Positive for chills and diaphoresis.   HENT: Positive for congestion, postnasal drip, rhinorrhea and sore throat. Negative for ear pain, sinus pain and trouble swallowing.    Eyes: Negative.    Respiratory: Positive for cough. Negative for shortness of breath and wheezing.    Cardiovascular: Negative.    Gastrointestinal: Positive for diarrhea. Negative for abdominal pain, nausea and vomiting.   Neurological: Positive for headaches. Negative for weakness, light-headedness and numbness.   Hematological: Negative for adenopathy.         Patient Active Problem List   Diagnosis    Morbid obesity with BMI of 50.0-59.9, adult    Gastroesophageal reflux disease    Insulin resistance syndrome    Androgen-dependent hirsutism    Iron deficiency anemia         Objective:     Vitals:    12/22/17 1121   BP: 130/80   Pulse: 88   Resp: 17   Temp: 98.2 °F (36.8 °C)   TempSrc: Tympanic   SpO2: 99%   Weight: 127.4 kg (280 lb 13.9 oz)   Height: 4' 11.5" (1.511 m)       Physical Exam   Constitutional: She is oriented to person, place, and time. She appears well-developed and well-nourished.   HENT:   Head: Normocephalic and atraumatic.   Right Ear: Tympanic membrane, external ear and ear canal normal.   Left Ear: Tympanic membrane, external ear and ear canal normal.   Nose: Mucosal edema and rhinorrhea present. Right sinus exhibits no maxillary sinus tenderness and no frontal sinus tenderness. Left sinus exhibits no maxillary sinus tenderness and no frontal sinus tenderness.   Mouth/Throat: No oropharyngeal exudate, posterior oropharyngeal edema or " posterior oropharyngeal erythema.   Eyes: Conjunctivae and EOM are normal. Pupils are equal, round, and reactive to light.   Cardiovascular: Normal rate, regular rhythm and normal heart sounds.    Pulmonary/Chest: Effort normal and breath sounds normal.   Lymphadenopathy:     She has no cervical adenopathy.   Neurological: She is alert and oriented to person, place, and time.   Vitals reviewed.        Medication List with Changes/Refills   New Medications    PREDNISONE (DELTASONE) 20 MG TABLET    Take 2 tab daily x 3 days, 1 tab daily x 3 days, then 1/2 tab daily x 2 days.    PROMETHAZINE-DEXTROMETHORPHAN (PROMETHAZINE-DM) 6.25-15 MG/5 ML SYRP    Take 5 mLs by mouth every 4 to 6 hours as needed.   Current Medications    CIMETIDINE (TAGAMET) 800 MG TABLET    Take 1 tablet (800 mg total) by mouth every evening.    IBUPROFEN (ADVIL,MOTRIN) 800 MG TABLET    Take 1 tablet (800 mg total) by mouth 3 (three) times daily as needed for Pain (with food).    METFORMIN (GLUCOPHAGE-XR) 500 MG 24 HR TABLET    Take 2 tablets (1,000 mg total) by mouth 2 (two) times daily with meals.    METHOCARBAMOL (ROBAXIN) 750 MG TAB    Take 1 tablet (750 mg total) by mouth 2 (two) times daily as needed.    SPIRONOLACTONE (ALDACTONE) 100 MG TABLET    Take 1 tablet (100 mg total) by mouth 2 (two) times daily.   Discontinued Medications    RABEPRAZOLE (ACIPHEX) 20 MG TABLET    Take 1 tablet (20 mg total) by mouth once daily.    RABEPRAZOLE (ACIPHEX) 20 MG TABLET    Take 1 tablet (20 mg total) by mouth once daily.           Diagnosis       1. Acute viral syndrome          Assessment/ Plan     Acute viral syndrome  -     predniSONE (DELTASONE) 20 MG tablet; Take 2 tab daily x 3 days, 1 tab daily x 3 days, then 1/2 tab daily x 2 days.  Dispense: 10 tablet; Refill: 0  -     promethazine-dextromethorphan (PROMETHAZINE-DM) 6.25-15 mg/5 mL Syrp; Take 5 mLs by mouth every 4 to 6 hours as needed.  Dispense: 180 mL; Refill: 0        Symptomatic care, rest,  fluids, hydration.  Follow-up in clinic as needed.        ASIM Bass  [facility]    none

## 2020-08-11 DIAGNOSIS — Z01.818 ENCOUNTER FOR OTHER PREPROCEDURAL EXAMINATION: ICD-10-CM

## 2020-08-11 DIAGNOSIS — K57.80 DIVERTICULITIS OF INTESTINE, PART UNSPECIFIED, WITH PERFORATION AND ABSCESS WITHOUT BLEEDING: ICD-10-CM

## 2020-08-12 ENCOUNTER — APPOINTMENT (OUTPATIENT)
Dept: COLORECTAL SURGERY | Facility: HOSPITAL | Age: 50
End: 2020-08-12

## 2020-08-12 ENCOUNTER — OUTPATIENT (OUTPATIENT)
Dept: OUTPATIENT SERVICES | Facility: HOSPITAL | Age: 50
LOS: 1 days | Discharge: HOME | End: 2020-08-12
Payer: COMMERCIAL

## 2020-08-12 VITALS
OXYGEN SATURATION: 98 % | WEIGHT: 173.94 LBS | HEIGHT: 65 IN | TEMPERATURE: 98 F | SYSTOLIC BLOOD PRESSURE: 131 MMHG | RESPIRATION RATE: 22 BRPM | DIASTOLIC BLOOD PRESSURE: 83 MMHG | HEART RATE: 93 BPM

## 2020-08-12 DIAGNOSIS — Z88.8 ALLERGY STATUS TO OTHER DRUGS, MEDICAMENTS AND BIOLOGICAL SUBSTANCES STATUS: ICD-10-CM

## 2020-08-12 DIAGNOSIS — Z90.710 ACQUIRED ABSENCE OF BOTH CERVIX AND UTERUS: Chronic | ICD-10-CM

## 2020-08-12 DIAGNOSIS — Z09 ENCOUNTER FOR FOLLOW-UP EXAMINATION AFTER COMPLETED TREATMENT FOR CONDITIONS OTHER THAN MALIGNANT NEOPLASM: ICD-10-CM

## 2020-08-12 DIAGNOSIS — Z98.890 OTHER SPECIFIED POSTPROCEDURAL STATES: Chronic | ICD-10-CM

## 2020-08-12 DIAGNOSIS — Z88.1 ALLERGY STATUS TO OTHER ANTIBIOTIC AGENTS STATUS: ICD-10-CM

## 2020-08-12 DIAGNOSIS — F17.210 NICOTINE DEPENDENCE, CIGARETTES, UNCOMPLICATED: ICD-10-CM

## 2020-08-12 DIAGNOSIS — K57.30 DIVERTICULOSIS OF LARGE INTESTINE WITHOUT PERFORATION OR ABSCESS WITHOUT BLEEDING: ICD-10-CM

## 2020-08-12 DIAGNOSIS — Z90.49 ACQUIRED ABSENCE OF OTHER SPECIFIED PARTS OF DIGESTIVE TRACT: ICD-10-CM

## 2020-08-12 DIAGNOSIS — K52.9 NONINFECTIVE GASTROENTERITIS AND COLITIS, UNSPECIFIED: ICD-10-CM

## 2020-08-12 DIAGNOSIS — Z20.828 CONTACT WITH AND (SUSPECTED) EXPOSURE TO OTHER VIRAL COMMUNICABLE DISEASES: ICD-10-CM

## 2020-08-12 DIAGNOSIS — K57.80 DIVERTICULITIS OF INTESTINE, PART UNSPECIFIED, WITH PERFORATION AND ABSCESS WITHOUT BLEEDING: ICD-10-CM

## 2020-08-12 DIAGNOSIS — Z90.49 ACQUIRED ABSENCE OF OTHER SPECIFIED PARTS OF DIGESTIVE TRACT: Chronic | ICD-10-CM

## 2020-08-12 PROCEDURE — G0104: CPT

## 2020-08-19 ENCOUNTER — APPOINTMENT (OUTPATIENT)
Dept: COLORECTAL SURGERY | Facility: CLINIC | Age: 50
End: 2020-08-19

## 2020-08-28 PROBLEM — K57.92 DIVERTICULITIS OF INTESTINE, PART UNSPECIFIED, WITHOUT PERFORATION OR ABSCESS WITHOUT BLEEDING: Chronic | Status: ACTIVE | Noted: 2020-08-10

## 2020-08-28 PROBLEM — Z86.59 PERSONAL HISTORY OF OTHER MENTAL AND BEHAVIORAL DISORDERS: Chronic | Status: ACTIVE | Noted: 2020-08-10

## 2020-08-28 PROBLEM — J30.2 OTHER SEASONAL ALLERGIC RHINITIS: Chronic | Status: ACTIVE | Noted: 2020-08-10

## 2020-08-28 PROBLEM — M19.90 UNSPECIFIED OSTEOARTHRITIS, UNSPECIFIED SITE: Chronic | Status: ACTIVE | Noted: 2020-08-10

## 2020-09-01 ENCOUNTER — APPOINTMENT (OUTPATIENT)
Dept: COLORECTAL SURGERY | Facility: CLINIC | Age: 50
End: 2020-09-01

## 2020-09-08 ENCOUNTER — APPOINTMENT (OUTPATIENT)
Dept: COLORECTAL SURGERY | Facility: CLINIC | Age: 50
End: 2020-09-08
Payer: COMMERCIAL

## 2020-09-08 VITALS
HEIGHT: 65 IN | DIASTOLIC BLOOD PRESSURE: 82 MMHG | WEIGHT: 176 LBS | RESPIRATION RATE: 14 BRPM | BODY MASS INDEX: 29.32 KG/M2 | HEART RATE: 85 BPM | SYSTOLIC BLOOD PRESSURE: 119 MMHG | TEMPERATURE: 98 F

## 2020-09-08 PROCEDURE — 99214 OFFICE O/P EST MOD 30 MIN: CPT

## 2020-09-08 RX ORDER — AMOXICILLIN AND CLAVULANATE POTASSIUM 875; 125 MG/1; MG/1
875-125 TABLET, COATED ORAL
Qty: 14 | Refills: 0 | Status: COMPLETED | COMMUNITY
Start: 2020-06-27

## 2020-09-08 NOTE — HISTORY OF PRESENT ILLNESS
[FreeTextEntry1] : Ms. Neves presents to the office for follow-up from her recent hospitalization 6/24/20 - 6/27/20 For sigmoid diverticulitis with associated abscess and impending colovesical fistula formation.\par \par Since her hospital discharge, she has completed her course of Augmentin.  She does not have much of an appetite but has no issues with nausea and vomiting after eating.  She does note some difficulties with passing stools which is unusual for her as she typically passes stools once daily.  She also notes some occasional left lower quadrant twinges of discomfort but denies significant abdominal pain such as what brought her to the hospital initially.  She also denies any fevers or chills.\par \par 9/8/20  presents to the office for followup. Since her last office visit, she had an attempt at colonoscopy on 8/12/20 but we were unable to complete the procedure secondary to severe angulation of the colon, at likely the level of prior diverticulitis which precluded safe advancement of the scope. She is here today to discuss ongoing mgmt and surgical intervention.

## 2020-09-08 NOTE — REASON FOR VISIT
[Follow-Up: _____] : a [unfilled] follow-up visit [FreeTextEntry1] : 6/24/20 - 6/27/20 For sigmoid diverticulitis with associated abscess and impending colovesical fistula formation

## 2020-09-08 NOTE — ASSESSMENT
[FreeTextEntry1] : Ms. Melissa moy presents to the office for follow-up after recent hospitalization at [facility] for diverticulitis with abscess formation and colovesical fistula.  Since her hospital discharge, she is feeling overall improved though continues to have mild left lower quadrant discomfort.  She is also experiencing some constipation.\par I have advised her to return to a high-fiber diet, with ample water intake and to add MiraLAX nightly to her bowel regimen.\par With regards to her left lower quadrant discomfort, we will obtain a CT A/P to ensure resolution of the abscess.\par If CT is negative, we plan to proceed with screening colonoscopy.\par The risks/benefits/alternatives for a colonoscopy were discussed. These include a less than 1% risk of bleeding should any polyps be biopsied and/or removed. There is also a less than 0.1% risk of perforation. The patient understands the need to adhere to a clear liquid diet the day prior to procedure as well as having to perform a bowel prep in order to allow for adequate visualization of the mucosal surfaces.  Followup colonoscopies will be scheduled based on the findings that are seen at the time of the procedure. Patient understands and is agreeable, and will proceed with consent and scheduling.\par If CT demonstrates persistent or worsening disease, I will notify her of this result and we will need to change our above plans to possibly proceed with surgery sooner than expected without colonoscopy versus using IV antibiotics as a bridge to elective resection.\par She understands, and is agreeable.\par \par 9/8/20 Ms. Neves presents to the office to discuss laparoscopic sigmoidectomy.  We were unable to complete colonoscopy secondary to severe angulation of the diseased sigmoid colon.  She is concerned of missed cancers upstream, but I have reassured her that should there have been a sizable malignancy in the remainder of the colon, it likely would be clinically symptomatic and identifiable to a certain extent on her prior imaging studies.  She was advised that in situations where surgical intervention precedes colonoscopic evaluation, follow-up colonoscopies will need to be performed to clear the remainder of the colon of polyps. \par We discussed the laparoscopic plan of approach with possible conversion to open should the operation not proceed in as timely a manner as expected.  The anatomy was defined and the risk of anastomotic leak was detailed. Leak rate is  ~5% despite our efforts to ensure a healthy, properly oriented, well perfused anastomosis that is not under tension is created.  Should such  a complication arise, depending on the magnitude, percutaneous drainage, diverging ileostomy or formation of an end colostomy are all options that will be pursued to treat the pelvic sepsis. Postoperative complications such just wound infection, dehiscence, prolonged postoperative ileus were all addressed. Duration of surgery, length of hospitalization, criterion for discharge, length of convalescence were detailed. \par Duration of surgery, length of hospitalization, criterion for discharge, length of convalescence were detailed. We also discussed the need to complete an oral abx and mechanical bowel prep prior to surgery, in addition to obtaining medical clearance and presurgical testing. After all questions were addressed, they were in agreement to proceed with scheduling.\par

## 2020-09-08 NOTE — PHYSICAL EXAM
[Alert] : alert [No Rash or Lesion] : No rash or lesion [Oriented to Person] : oriented to person [Oriented to Place] : oriented to place [Oriented to Time] : oriented to time [Calm] : calm [de-identified] : Soft, nontender and nondistended [de-identified] : No apparent distress [de-identified] : Normocephalic atraumatic [de-identified] : Moving all extremities x4

## 2021-01-29 ENCOUNTER — APPOINTMENT (OUTPATIENT)
Dept: COLORECTAL SURGERY | Facility: CLINIC | Age: 51
End: 2021-01-29
Payer: COMMERCIAL

## 2021-01-29 ENCOUNTER — NON-APPOINTMENT (OUTPATIENT)
Age: 51
End: 2021-01-29

## 2021-01-29 VITALS
SYSTOLIC BLOOD PRESSURE: 120 MMHG | BODY MASS INDEX: 29.32 KG/M2 | HEIGHT: 65 IN | WEIGHT: 176 LBS | DIASTOLIC BLOOD PRESSURE: 82 MMHG | TEMPERATURE: 98 F | HEART RATE: 85 BPM | RESPIRATION RATE: 14 BRPM

## 2021-01-29 DIAGNOSIS — K57.80 DIVERTICULITIS OF INTESTINE, PART UNSPECIFIED, WITH PERFORATION AND ABSCESS W/OUT BLEEDING: ICD-10-CM

## 2021-01-29 PROCEDURE — 99072 ADDL SUPL MATRL&STAF TM PHE: CPT

## 2021-01-29 PROCEDURE — 99214 OFFICE O/P EST MOD 30 MIN: CPT

## 2021-01-29 NOTE — PHYSICAL EXAM
[No Rash or Lesion] : No rash or lesion [Alert] : alert [Oriented to Person] : oriented to person [Oriented to Place] : oriented to place [Oriented to Time] : oriented to time [Calm] : calm [de-identified] : Soft, nontender and nondistended [de-identified] : No apparent distress [de-identified] : Normocephalic atraumatic [de-identified] : Moving all extremities x4

## 2021-01-29 NOTE — HISTORY OF PRESENT ILLNESS
[FreeTextEntry1] : Ms. Neves presents to the office for follow-up from her recent hospitalization 6/24/20 - 6/27/20 For sigmoid diverticulitis with associated abscess and impending colovesical fistula formation.\par \par Since her hospital discharge, she has completed her course of Augmentin.  She does not have much of an appetite but has no issues with nausea and vomiting after eating.  She does note some difficulties with passing stools which is unusual for her as she typically passes stools once daily.  She also notes some occasional left lower quadrant twinges of discomfort but denies significant abdominal pain such as what brought her to the hospital initially.  She also denies any fevers or chills.\par \par 9/8/20  presents to the office for followup. Since her last office visit, she had an attempt at colonoscopy on 8/12/20 but we were unable to complete the procedure secondary to severe angulation of the colon, at likely the level of prior diverticulitis which precluded safe advancement of the scope. She is here today to discuss ongoing mgmt and surgical intervention.\par \par 1/29/21 Ms. Neves presents to the office for followup. She reports continued difficulties with BMs and feeling as if she is not fully evacuating. Colonoscopy attempted 8/2020 was incomplete secondary to tight angulation of sigmoid colon. She reports urinary frequency but no pneumaturia, dysuria or fecaluria.  No

## 2021-01-29 NOTE — ASSESSMENT
[FreeTextEntry1] : Ms. Melissa moy presents to the office for follow-up after recent hospitalization at [facility] for diverticulitis with abscess formation and colovesical fistula.  Since her hospital discharge, she is feeling overall improved though continues to have mild left lower quadrant discomfort.  She is also experiencing some constipation.\par I have advised her to return to a high-fiber diet, with ample water intake and to add MiraLAX nightly to her bowel regimen.\par With regards to her left lower quadrant discomfort, we will obtain a CT A/P to ensure resolution of the abscess.\par If CT is negative, we plan to proceed with screening colonoscopy.\par The risks/benefits/alternatives for a colonoscopy were discussed. These include a less than 1% risk of bleeding should any polyps be biopsied and/or removed. There is also a less than 0.1% risk of perforation. The patient understands the need to adhere to a clear liquid diet the day prior to procedure as well as having to perform a bowel prep in order to allow for adequate visualization of the mucosal surfaces.  Followup colonoscopies will be scheduled based on the findings that are seen at the time of the procedure. Patient understands and is agreeable, and will proceed with consent and scheduling.\par If CT demonstrates persistent or worsening disease, I will notify her of this result and we will need to change our above plans to possibly proceed with surgery sooner than expected without colonoscopy versus using IV antibiotics as a bridge to elective resection.\par She understands, and is agreeable.\par \par 9/8/20 Ms. Neves presents to the office to discuss laparoscopic sigmoidectomy.  We were unable to complete colonoscopy secondary to severe angulation of the diseased sigmoid colon.  She is concerned of missed cancers upstream, but I have reassured her that should there have been a sizable malignancy in the remainder of the colon, it likely would be clinically symptomatic and identifiable to a certain extent on her prior imaging studies.  She was advised that in situations where surgical intervention precedes colonoscopic evaluation, follow-up colonoscopies will need to be performed to clear the remainder of the colon of polyps. \par We discussed the laparoscopic plan of approach with possible conversion to open should the operation not proceed in as timely a manner as expected.  The anatomy was defined and the risk of anastomotic leak was detailed. Leak rate is  ~5% despite our efforts to ensure a healthy, properly oriented, well perfused anastomosis that is not under tension is created.  Should such  a complication arise, depending on the magnitude, percutaneous drainage, diverging ileostomy or formation of an end colostomy are all options that will be pursued to treat the pelvic sepsis. Postoperative complications such just wound infection, dehiscence, prolonged postoperative ileus were all addressed. Duration of surgery, length of hospitalization, criterion for discharge, length of convalescence were detailed. \par Duration of surgery, length of hospitalization, criterion for discharge, length of convalescence were detailed. We also discussed the need to complete an oral abx and mechanical bowel prep prior to surgery, in addition to obtaining medical clearance and presurgical testing. After all questions were addressed, they were in agreement to proceed with scheduling.\par \par 1/29/21 Ms. Neves presents to the office for followup. Since her last office visit, she continues to have issues with bowel movements and is now more amenable to surgical intervention. \par Given the patient's disease progression with worsening symptoms, elective major surgery with multiple risks was discussed. We discussed the laparoscopic plan of approach with possible conversion to open should the operation not proceed in as timely a manner as expected.  The anatomy was defined and the risk of anastomotic leak was detailed. Leak rate is  ~5% despite our efforts to ensure a healthy, properly oriented, well perfused anastomosis that is not under tension is created.  Should such  a complication arise, depending on the magnitude, percutaneous drainage, diverging ileostomy or formation of an end colostomy are all options that will be pursued to treat the pelvic sepsis. Postoperative complications such just wound infection, dehiscence, prolonged postoperative ileus were all addressed. Duration of surgery, length of hospitalization, criterion for discharge, length of convalescence were detailed. \par Duration of surgery, length of hospitalization, criterion for discharge, length of convalescence were detailed. We also discussed the need to complete an oral abx and mechanical bowel prep prior to surgery, in addition to obtaining medical clearance and presurgical testing. After all questions were addressed, they were in agreement to proceed with scheduling.\par She requests a preop CT to assess the prior visualized possible colovescial fistula. Order has been placed.\par

## 2021-02-05 ENCOUNTER — OUTPATIENT (OUTPATIENT)
Dept: OUTPATIENT SERVICES | Facility: HOSPITAL | Age: 51
LOS: 1 days | End: 2021-02-05
Payer: COMMERCIAL

## 2021-02-05 ENCOUNTER — APPOINTMENT (OUTPATIENT)
Dept: CT IMAGING | Facility: CLINIC | Age: 51
End: 2021-02-05
Payer: COMMERCIAL

## 2021-02-05 DIAGNOSIS — K57.80 DIVERTICULITIS OF INTESTINE, PART UNSPECIFIED, WITH PERFORATION AND ABSCESS WITHOUT BLEEDING: ICD-10-CM

## 2021-02-05 DIAGNOSIS — Z98.890 OTHER SPECIFIED POSTPROCEDURAL STATES: Chronic | ICD-10-CM

## 2021-02-05 DIAGNOSIS — Z90.710 ACQUIRED ABSENCE OF BOTH CERVIX AND UTERUS: Chronic | ICD-10-CM

## 2021-02-05 DIAGNOSIS — Z90.49 ACQUIRED ABSENCE OF OTHER SPECIFIED PARTS OF DIGESTIVE TRACT: Chronic | ICD-10-CM

## 2021-02-05 PROCEDURE — 74177 CT ABD & PELVIS W/CONTRAST: CPT

## 2021-02-05 PROCEDURE — 74177 CT ABD & PELVIS W/CONTRAST: CPT | Mod: 26

## 2021-03-04 ENCOUNTER — OUTPATIENT (OUTPATIENT)
Dept: OUTPATIENT SERVICES | Facility: HOSPITAL | Age: 51
LOS: 1 days | End: 2021-03-04
Payer: COMMERCIAL

## 2021-03-04 VITALS
HEIGHT: 65 IN | TEMPERATURE: 98 F | WEIGHT: 171.96 LBS | DIASTOLIC BLOOD PRESSURE: 77 MMHG | SYSTOLIC BLOOD PRESSURE: 113 MMHG | RESPIRATION RATE: 16 BRPM | HEART RATE: 67 BPM | OXYGEN SATURATION: 100 %

## 2021-03-04 DIAGNOSIS — Z98.890 OTHER SPECIFIED POSTPROCEDURAL STATES: Chronic | ICD-10-CM

## 2021-03-04 DIAGNOSIS — Z90.710 ACQUIRED ABSENCE OF BOTH CERVIX AND UTERUS: Chronic | ICD-10-CM

## 2021-03-04 DIAGNOSIS — Z90.49 ACQUIRED ABSENCE OF OTHER SPECIFIED PARTS OF DIGESTIVE TRACT: Chronic | ICD-10-CM

## 2021-03-04 DIAGNOSIS — K57.80 DIVERTICULITIS OF INTESTINE, PART UNSPECIFIED, WITH PERFORATION AND ABSCESS WITHOUT BLEEDING: ICD-10-CM

## 2021-03-04 DIAGNOSIS — Z01.818 ENCOUNTER FOR OTHER PREPROCEDURAL EXAMINATION: ICD-10-CM

## 2021-03-04 DIAGNOSIS — Z29.9 ENCOUNTER FOR PROPHYLACTIC MEASURES, UNSPECIFIED: ICD-10-CM

## 2021-03-04 LAB
A1C WITH ESTIMATED AVERAGE GLUCOSE RESULT: 5.7 % — HIGH (ref 4–5.6)
ANION GAP SERPL CALC-SCNC: 4 MMOL/L — LOW (ref 5–17)
APTT BLD: 33.5 SEC — SIGNIFICANT CHANGE UP (ref 27.5–35.5)
BASOPHILS # BLD AUTO: 0.06 K/UL — SIGNIFICANT CHANGE UP (ref 0–0.2)
BASOPHILS NFR BLD AUTO: 0.6 % — SIGNIFICANT CHANGE UP (ref 0–2)
BUN SERPL-MCNC: 18 MG/DL — SIGNIFICANT CHANGE UP (ref 7–23)
CALCIUM SERPL-MCNC: 9.1 MG/DL — SIGNIFICANT CHANGE UP (ref 8.5–10.1)
CHLORIDE SERPL-SCNC: 110 MMOL/L — HIGH (ref 96–108)
CO2 SERPL-SCNC: 27 MMOL/L — SIGNIFICANT CHANGE UP (ref 22–31)
CREAT SERPL-MCNC: 0.73 MG/DL — SIGNIFICANT CHANGE UP (ref 0.5–1.3)
EOSINOPHIL # BLD AUTO: 0.1 K/UL — SIGNIFICANT CHANGE UP (ref 0–0.5)
EOSINOPHIL NFR BLD AUTO: 1.1 % — SIGNIFICANT CHANGE UP (ref 0–6)
ESTIMATED AVERAGE GLUCOSE: 117 MG/DL — HIGH (ref 68–114)
GLUCOSE SERPL-MCNC: 116 MG/DL — HIGH (ref 70–99)
HCT VFR BLD CALC: 42.7 % — SIGNIFICANT CHANGE UP (ref 34.5–45)
HGB BLD-MCNC: 13.9 G/DL — SIGNIFICANT CHANGE UP (ref 11.5–15.5)
IMM GRANULOCYTES NFR BLD AUTO: 0.3 % — SIGNIFICANT CHANGE UP (ref 0–1.5)
INR BLD: 0.95 RATIO — SIGNIFICANT CHANGE UP (ref 0.88–1.16)
LYMPHOCYTES # BLD AUTO: 2.4 K/UL — SIGNIFICANT CHANGE UP (ref 1–3.3)
LYMPHOCYTES # BLD AUTO: 25.4 % — SIGNIFICANT CHANGE UP (ref 13–44)
MCHC RBC-ENTMCNC: 29.7 PG — SIGNIFICANT CHANGE UP (ref 27–34)
MCHC RBC-ENTMCNC: 32.6 GM/DL — SIGNIFICANT CHANGE UP (ref 32–36)
MCV RBC AUTO: 91.2 FL — SIGNIFICANT CHANGE UP (ref 80–100)
MONOCYTES # BLD AUTO: 0.58 K/UL — SIGNIFICANT CHANGE UP (ref 0–0.9)
MONOCYTES NFR BLD AUTO: 6.1 % — SIGNIFICANT CHANGE UP (ref 2–14)
NEUTROPHILS # BLD AUTO: 6.28 K/UL — SIGNIFICANT CHANGE UP (ref 1.8–7.4)
NEUTROPHILS NFR BLD AUTO: 66.5 % — SIGNIFICANT CHANGE UP (ref 43–77)
PLATELET # BLD AUTO: 245 K/UL — SIGNIFICANT CHANGE UP (ref 150–400)
POTASSIUM SERPL-MCNC: 3.8 MMOL/L — SIGNIFICANT CHANGE UP (ref 3.5–5.3)
POTASSIUM SERPL-SCNC: 3.8 MMOL/L — SIGNIFICANT CHANGE UP (ref 3.5–5.3)
PROTHROM AB SERPL-ACNC: 11 SEC — SIGNIFICANT CHANGE UP (ref 10.6–13.6)
RBC # BLD: 4.68 M/UL — SIGNIFICANT CHANGE UP (ref 3.8–5.2)
RBC # FLD: 13.4 % — SIGNIFICANT CHANGE UP (ref 10.3–14.5)
SODIUM SERPL-SCNC: 141 MMOL/L — SIGNIFICANT CHANGE UP (ref 135–145)
WBC # BLD: 9.45 K/UL — SIGNIFICANT CHANGE UP (ref 3.8–10.5)
WBC # FLD AUTO: 9.45 K/UL — SIGNIFICANT CHANGE UP (ref 3.8–10.5)

## 2021-03-04 PROCEDURE — 85730 THROMBOPLASTIN TIME PARTIAL: CPT

## 2021-03-04 PROCEDURE — 36415 COLL VENOUS BLD VENIPUNCTURE: CPT

## 2021-03-04 PROCEDURE — 85610 PROTHROMBIN TIME: CPT

## 2021-03-04 PROCEDURE — 86901 BLOOD TYPING SEROLOGIC RH(D): CPT

## 2021-03-04 PROCEDURE — 80048 BASIC METABOLIC PNL TOTAL CA: CPT

## 2021-03-04 PROCEDURE — 83036 HEMOGLOBIN GLYCOSYLATED A1C: CPT

## 2021-03-04 PROCEDURE — 86850 RBC ANTIBODY SCREEN: CPT

## 2021-03-04 PROCEDURE — 93010 ELECTROCARDIOGRAM REPORT: CPT

## 2021-03-04 PROCEDURE — 85025 COMPLETE CBC W/AUTO DIFF WBC: CPT

## 2021-03-04 PROCEDURE — 86900 BLOOD TYPING SEROLOGIC ABO: CPT

## 2021-03-04 PROCEDURE — 93005 ELECTROCARDIOGRAM TRACING: CPT

## 2021-03-04 PROCEDURE — G0463: CPT | Mod: 25

## 2021-03-04 NOTE — H&P PST ADULT - NSICDXPASTSURGICALHX_GEN_ALL_CORE_FT
PAST SURGICAL HISTORY:  H/O colonoscopy 9/2020    H/O myomectomy 2011    H/O: hysterectomy due to fibroids 7/2013    History of cholecystectomy 2000

## 2021-03-04 NOTE — H&P PST ADULT - HEMATOLOGY/LYMPHATICS
Spironolactone Counseling: Patient advised regarding risks of diarrhea, abdominal pain, hyperkalemia, birth defects (for female patients), liver toxicity and renal toxicity. The patient may need blood work to monitor liver and kidney function and potassium levels while on therapy. The patient verbalized understanding of the proper use and possible adverse effects of spironolactone.  All of the patient's questions and concerns were addressed. negative

## 2021-03-04 NOTE — H&P PST ADULT - ASSESSMENT
51 year old female with diverticulitis; pt was in  2020 due to diverticular abscess; currently denies abdominal pain; she presents to PST for planned laparoscopic possible open sigmoid colectomy     Plan:  1. PST instructions given ; NPO status instructions to be given by ASU   2. Pt instructed to take following meds with sip of water : none   3. Pt instructed to take routine evening medications unless indicated   4. Stop NSAIDS ( Aspirin Alev Motrin Mobic Diclofenac), herbal supplements , MVI , Vitamin fish oil 7 days prior to surgery  unless   directed by surgeon or cardiologist;   5. Medical Optimization  with Dr Mishra   6. EZ wash instructions given   7. Labs EKG CXR as per surgeon request   8. Pt instructed to self quarantine after Covid test   9. Covid Testing scheduled Pt notified and aware  10. Pt denies covid symptoms shortness of breath fever cough       CAPRINI SCORE [CLOT]    AGE RELATED RISK FACTORS                                                       MOBILITY RELATED FACTORS  [x ] Age 41-60 years                                            (1 Point)                  [ ] Bed rest                                                        (1 Point)  [ ] Age: 61-74 years                                           (2 Points)                 [ ] Plaster cast                                                   (2 Points)  [ ] Age= 75 years                                              (3 Points)                 [ ] Bed bound for more than 72 hours                 (2 Points)    DISEASE RELATED RISK FACTORS                                               GENDER SPECIFIC FACTORS  [ ] Edema in the lower extremities                       (1 Point)                  [ ] Pregnancy                                                     (1 Point)  [ ] Varicose veins                                               (1 Point)                  [ ] Post-partum < 6 weeks                                   (1 Point)             [x ] BMI > 25 Kg/m2                                            (1 Point)                  [ ] Hormonal therapy  or oral contraception          (1 Point)                 [ ] Sepsis (in the previous month)                        (1 Point)                  [ ] History of pregnancy complications                 (1 point)  [ ] Pneumonia or serious lung disease                                               [ ] Unexplained or recurrent                     (1 Point)           (in the previous month)                               (1 Point)  [ ] Abnormal pulmonary function test                     (1 Point)                 SURGERY RELATED RISK FACTORS  [ ] Acute myocardial infarction                              (1 Point)                 [ ]  Section                                             (1 Point)  [ ] Congestive heart failure (in the previous month)  (1 Point)               [ ] Minor surgery                                                  (1 Point)   [x ] Inflammatory bowel disease                             (1 Point)                 [ ] Arthroscopic surgery                                        (2 Points)  [ ] Central venous access                                      (2 Points)                [ x] General surgery lasting more than 45 minutes   (2 Points)       [ ] Stroke (in the previous month)                          (5 Points)               [ ] Elective arthroplasty                                         (5 Points)            ( ) presents and past malignancy                           (2 points)                                                                                                         HEMATOLOGY RELATED FACTORS                                                 TRAUMA RELATED RISK FACTORS  [ ] Prior episodes of VTE                                     (3 Points)                 [ ] Fracture of the hip, pelvis, or leg                       (5 Points)  [ ] Positive family history for VTE                         (3 Points)                 [ ] Acute spinal cord injury (in the previous month)  (5 Points)  [ ] Prothrombin 29909 A                                     (3 Points)                 [ ] Paralysis  (less than 1 month)                             (5 Points)  [ ] Factor V Leiden                                             (3 Points)                  [ ] Multiple Trauma within 1 month                        (5 Points)  [ ] Lupus anticoagulants                                     (3 Points)                                                           [ ] Anticardiolipin antibodies                               (3 Points)                                                       [ ] High homocysteine in the blood                      (3 Points)                                             [ ] Other congenital or acquired thrombophilia      (3 Points)                                                [ ] Heparin induced thrombocytopenia                  (3 Points)                                          Total Score [       5   ]

## 2021-03-04 NOTE — H&P PST ADULT - NSICDXPASTMEDICALHX_GEN_ALL_CORE_FT
PAST MEDICAL HISTORY:  Arthritis spine    Diverticulitis     Herniated cervical disc ROM intct lumbar and thoracicic bulging disc due  to arthritis    History of anxiety     History of diverticular abscess 6/2020    HLD (hyperlipidemia)     Seasonal allergies     Smoker

## 2021-03-04 NOTE — H&P PST ADULT - HISTORY OF PRESENT ILLNESS
51 year old female with diverticulitis; pt was in  June 2020 due to diverticular abscess; currently denies abdominal pain; she presents to [facility] for planned laparoscopic possible open sigmoid colectomy

## 2021-03-05 DIAGNOSIS — Z01.818 ENCOUNTER FOR OTHER PREPROCEDURAL EXAMINATION: ICD-10-CM

## 2021-03-05 DIAGNOSIS — K57.80 DIVERTICULITIS OF INTESTINE, PART UNSPECIFIED, WITH PERFORATION AND ABSCESS WITHOUT BLEEDING: ICD-10-CM

## 2021-03-06 ENCOUNTER — APPOINTMENT (OUTPATIENT)
Dept: DISASTER EMERGENCY | Facility: CLINIC | Age: 51
End: 2021-03-06

## 2021-03-07 LAB — SARS-COV-2 N GENE NPH QL NAA+PROBE: NOT DETECTED

## 2021-03-08 PROBLEM — Z87.19 PERSONAL HISTORY OF OTHER DISEASES OF THE DIGESTIVE SYSTEM: Chronic | Status: ACTIVE | Noted: 2021-03-04

## 2021-03-08 PROBLEM — F17.200 NICOTINE DEPENDENCE, UNSPECIFIED, UNCOMPLICATED: Chronic | Status: ACTIVE | Noted: 2021-03-04

## 2021-03-08 PROBLEM — M50.20 OTHER CERVICAL DISC DISPLACEMENT, UNSPECIFIED CERVICAL REGION: Chronic | Status: ACTIVE | Noted: 2021-03-04

## 2021-03-08 PROBLEM — E78.5 HYPERLIPIDEMIA, UNSPECIFIED: Chronic | Status: ACTIVE | Noted: 2021-03-04

## 2021-03-09 ENCOUNTER — RESULT REVIEW (OUTPATIENT)
Age: 51
End: 2021-03-09

## 2021-03-09 ENCOUNTER — INPATIENT (INPATIENT)
Facility: HOSPITAL | Age: 51
LOS: 4 days | Discharge: HOME | DRG: 330 | End: 2021-03-14
Attending: COLON & RECTAL SURGERY | Admitting: COLON & RECTAL SURGERY
Payer: COMMERCIAL

## 2021-03-09 ENCOUNTER — APPOINTMENT (OUTPATIENT)
Dept: COLORECTAL SURGERY | Facility: HOSPITAL | Age: 51
End: 2021-03-09

## 2021-03-09 VITALS
DIASTOLIC BLOOD PRESSURE: 73 MMHG | HEIGHT: 65 IN | WEIGHT: 171.96 LBS | OXYGEN SATURATION: 100 % | RESPIRATION RATE: 15 BRPM | TEMPERATURE: 98 F | HEART RATE: 86 BPM | SYSTOLIC BLOOD PRESSURE: 112 MMHG

## 2021-03-09 DIAGNOSIS — Z98.890 OTHER SPECIFIED POSTPROCEDURAL STATES: Chronic | ICD-10-CM

## 2021-03-09 DIAGNOSIS — Z90.49 ACQUIRED ABSENCE OF OTHER SPECIFIED PARTS OF DIGESTIVE TRACT: Chronic | ICD-10-CM

## 2021-03-09 DIAGNOSIS — K57.80 DIVERTICULITIS OF INTESTINE, PART UNSPECIFIED, WITH PERFORATION AND ABSCESS WITHOUT BLEEDING: ICD-10-CM

## 2021-03-09 DIAGNOSIS — Z90.710 ACQUIRED ABSENCE OF BOTH CERVIX AND UTERUS: Chronic | ICD-10-CM

## 2021-03-09 PROCEDURE — 88307 TISSUE EXAM BY PATHOLOGIST: CPT | Mod: 26

## 2021-03-09 PROCEDURE — C9113: CPT

## 2021-03-09 PROCEDURE — 84100 ASSAY OF PHOSPHORUS: CPT

## 2021-03-09 PROCEDURE — C9399: CPT

## 2021-03-09 PROCEDURE — 82962 GLUCOSE BLOOD TEST: CPT

## 2021-03-09 PROCEDURE — 85027 COMPLETE CBC AUTOMATED: CPT

## 2021-03-09 PROCEDURE — 88307 TISSUE EXAM BY PATHOLOGIST: CPT

## 2021-03-09 PROCEDURE — 83735 ASSAY OF MAGNESIUM: CPT

## 2021-03-09 PROCEDURE — 88304 TISSUE EXAM BY PATHOLOGIST: CPT

## 2021-03-09 PROCEDURE — 44120 REMOVAL OF SMALL INTESTINE: CPT

## 2021-03-09 PROCEDURE — 80048 BASIC METABOLIC PNL TOTAL CA: CPT

## 2021-03-09 PROCEDURE — 45300 PROCTOSIGMOIDOSCOPY DX: CPT | Mod: 59

## 2021-03-09 PROCEDURE — 36415 COLL VENOUS BLD VENIPUNCTURE: CPT

## 2021-03-09 PROCEDURE — 88304 TISSUE EXAM BY PATHOLOGIST: CPT | Mod: 26

## 2021-03-09 PROCEDURE — 44140 PARTIAL REMOVAL OF COLON: CPT

## 2021-03-09 RX ORDER — KETOROLAC TROMETHAMINE 30 MG/ML
15 SYRINGE (ML) INJECTION EVERY 6 HOURS
Refills: 0 | Status: DISCONTINUED | OUTPATIENT
Start: 2021-03-09 | End: 2021-03-10

## 2021-03-09 RX ORDER — CEFOTETAN DISODIUM 1 G
2 VIAL (EA) INJECTION ONCE
Refills: 0 | Status: COMPLETED | OUTPATIENT
Start: 2021-03-09 | End: 2021-03-09

## 2021-03-09 RX ORDER — SODIUM CHLORIDE 9 MG/ML
1000 INJECTION, SOLUTION INTRAVENOUS
Refills: 0 | Status: DISCONTINUED | OUTPATIENT
Start: 2021-03-09 | End: 2021-03-09

## 2021-03-09 RX ORDER — ENOXAPARIN SODIUM 100 MG/ML
40 INJECTION SUBCUTANEOUS DAILY
Refills: 0 | Status: DISCONTINUED | OUTPATIENT
Start: 2021-03-09 | End: 2021-03-09

## 2021-03-09 RX ORDER — CEFOTETAN DISODIUM 1 G
1 VIAL (EA) INJECTION EVERY 12 HOURS
Refills: 0 | Status: DISCONTINUED | OUTPATIENT
Start: 2021-03-09 | End: 2021-03-09

## 2021-03-09 RX ORDER — ACETAMINOPHEN 500 MG
1000 TABLET ORAL ONCE
Refills: 0 | Status: DISCONTINUED | OUTPATIENT
Start: 2021-03-09 | End: 2021-03-09

## 2021-03-09 RX ORDER — HEPARIN SODIUM 5000 [USP'U]/ML
5000 INJECTION INTRAVENOUS; SUBCUTANEOUS ONCE
Refills: 0 | Status: COMPLETED | OUTPATIENT
Start: 2021-03-09 | End: 2021-03-09

## 2021-03-09 RX ORDER — ACETAMINOPHEN 500 MG
1000 TABLET ORAL ONCE
Refills: 0 | Status: DISCONTINUED | OUTPATIENT
Start: 2021-03-09 | End: 2021-03-14

## 2021-03-09 RX ORDER — CEFOTETAN DISODIUM 1 G
2 VIAL (EA) INJECTION ONCE
Refills: 0 | Status: DISCONTINUED | OUTPATIENT
Start: 2021-03-09 | End: 2021-03-09

## 2021-03-09 RX ORDER — NICOTINE POLACRILEX 2 MG
1 GUM BUCCAL DAILY
Refills: 0 | Status: DISCONTINUED | OUTPATIENT
Start: 2021-03-09 | End: 2021-03-14

## 2021-03-09 RX ORDER — INFLUENZA VIRUS VACCINE 15; 15; 15; 15 UG/.5ML; UG/.5ML; UG/.5ML; UG/.5ML
0.5 SUSPENSION INTRAMUSCULAR ONCE
Refills: 0 | Status: COMPLETED | OUTPATIENT
Start: 2021-03-09 | End: 2021-03-09

## 2021-03-09 RX ORDER — ALVIMOPAN 12 MG/1
12 CAPSULE ORAL
Refills: 0 | Status: DISCONTINUED | OUTPATIENT
Start: 2021-03-09 | End: 2021-03-09

## 2021-03-09 RX ORDER — SODIUM CHLORIDE 9 MG/ML
1000 INJECTION, SOLUTION INTRAVENOUS
Refills: 0 | Status: DISCONTINUED | OUTPATIENT
Start: 2021-03-09 | End: 2021-03-12

## 2021-03-09 RX ORDER — MORPHINE SULFATE 50 MG/1
4 CAPSULE, EXTENDED RELEASE ORAL EVERY 4 HOURS
Refills: 0 | Status: DISCONTINUED | OUTPATIENT
Start: 2021-03-09 | End: 2021-03-14

## 2021-03-09 RX ORDER — ALVIMOPAN 12 MG/1
12 CAPSULE ORAL ONCE
Refills: 0 | Status: COMPLETED | OUTPATIENT
Start: 2021-03-09 | End: 2021-03-09

## 2021-03-09 RX ORDER — ONDANSETRON 8 MG/1
4 TABLET, FILM COATED ORAL ONCE
Refills: 0 | Status: DISCONTINUED | OUTPATIENT
Start: 2021-03-09 | End: 2021-03-09

## 2021-03-09 RX ORDER — HYDROMORPHONE HYDROCHLORIDE 2 MG/ML
0.5 INJECTION INTRAMUSCULAR; INTRAVENOUS; SUBCUTANEOUS
Refills: 0 | Status: DISCONTINUED | OUTPATIENT
Start: 2021-03-09 | End: 2021-03-09

## 2021-03-09 RX ORDER — ALVIMOPAN 12 MG/1
12 CAPSULE ORAL
Refills: 0 | Status: DISCONTINUED | OUTPATIENT
Start: 2021-03-10 | End: 2021-03-12

## 2021-03-09 RX ORDER — ENOXAPARIN SODIUM 100 MG/ML
40 INJECTION SUBCUTANEOUS DAILY
Refills: 0 | Status: DISCONTINUED | OUTPATIENT
Start: 2021-03-09 | End: 2021-03-14

## 2021-03-09 RX ORDER — ATORVASTATIN CALCIUM 80 MG/1
20 TABLET, FILM COATED ORAL AT BEDTIME
Refills: 0 | Status: DISCONTINUED | OUTPATIENT
Start: 2021-03-09 | End: 2021-03-14

## 2021-03-09 RX ORDER — CEFOTETAN DISODIUM 1 G
1 VIAL (EA) INJECTION EVERY 12 HOURS
Refills: 0 | Status: COMPLETED | OUTPATIENT
Start: 2021-03-09 | End: 2021-03-10

## 2021-03-09 RX ORDER — MORPHINE SULFATE 50 MG/1
4 CAPSULE, EXTENDED RELEASE ORAL EVERY 4 HOURS
Refills: 0 | Status: DISCONTINUED | OUTPATIENT
Start: 2021-03-09 | End: 2021-03-09

## 2021-03-09 RX ORDER — KETOROLAC TROMETHAMINE 30 MG/ML
15 SYRINGE (ML) INJECTION EVERY 6 HOURS
Refills: 0 | Status: DISCONTINUED | OUTPATIENT
Start: 2021-03-09 | End: 2021-03-09

## 2021-03-09 RX ADMIN — Medication 100 GRAM(S): at 22:08

## 2021-03-09 RX ADMIN — SODIUM CHLORIDE 100 MILLILITER(S): 9 INJECTION, SOLUTION INTRAVENOUS at 21:02

## 2021-03-09 RX ADMIN — ALVIMOPAN 12 MILLIGRAM(S): 12 CAPSULE ORAL at 10:27

## 2021-03-09 RX ADMIN — HEPARIN SODIUM 5000 UNIT(S): 5000 INJECTION INTRAVENOUS; SUBCUTANEOUS at 10:22

## 2021-03-09 RX ADMIN — Medication 15 MILLIGRAM(S): at 21:25

## 2021-03-09 RX ADMIN — HYDROMORPHONE HYDROCHLORIDE 0.5 MILLIGRAM(S): 2 INJECTION INTRAMUSCULAR; INTRAVENOUS; SUBCUTANEOUS at 17:53

## 2021-03-09 RX ADMIN — Medication 15 MILLIGRAM(S): at 21:02

## 2021-03-09 RX ADMIN — HYDROMORPHONE HYDROCHLORIDE 0.5 MILLIGRAM(S): 2 INJECTION INTRAMUSCULAR; INTRAVENOUS; SUBCUTANEOUS at 19:39

## 2021-03-09 RX ADMIN — HYDROMORPHONE HYDROCHLORIDE 0.5 MILLIGRAM(S): 2 INJECTION INTRAMUSCULAR; INTRAVENOUS; SUBCUTANEOUS at 19:24

## 2021-03-09 NOTE — ASU PREOP CHECKLIST - NS PREOP CHK TEST_COVID_DT_GEN_ALL_CORE
S: patient states she is slightly sore in the right side after getting dry needling yesterday during formal therapy. She is having less pain in her right hip and can still feel the pain in the left groin, going slightly down the leg.    O: Dry Needling:  Patient provided a handout explaining intermuscular mobilization.  Patient has read the handout and has provided verbal agreement to proceed with the intervention.    Needles inserted 1   Needles removed 1   Locations and Needle Size 75 mm left psoas major   Treatment duration 15   Patient response Soreness     Inferior left hip mobilizations in side lying grade 3    Long Axis distraction grade 3 to left hip in side lying    Inferior superior left pelvic rocking in side lying    Reviewed soreness of low back in regards to psoas needling    A: Patient displays mild to moderate soreness in low back post session with minor soreness of left abdomin.    P: Progress overall hip flexor mobility.  
07-Mar-2021

## 2021-03-09 NOTE — BRIEF OPERATIVE NOTE - OPERATION/FINDINGS
PREVIOUS MESH ,  DENSE ADHESIONS IN ABDOMEN, SMALL BOWEL LOOPS STUCK TO ABDOMINAL WALL  SMALL BOWEL ADHESIONS    DIAGNOSTIC LAPAORSCOPY,  CONVERTED TO OPEN, FOR TAKE DOWN OF FISTULA  SMALL BOWEL RESECTION X 2 ~20 CM OF BOWEL RESECTED

## 2021-03-09 NOTE — BRIEF OPERATIVE NOTE - NSICDXBRIEFPROCEDURE_GEN_ALL_CORE_FT
PROCEDURES:  Small bowel resection with anastomosis 09-Mar-2021 17:25:23  RUDDY AYON  Closure of enterovesical fistula with resection of intestine 09-Mar-2021 17:24:23  RUDDY AYON  Open sigmoidectomy 09-Mar-2021 17:22:58  RUDDY AYON  Exploratory laparotomy 09-Mar-2021 17:22:41  RUDDY AYON  Diagnostic laparoscopy 09-Mar-2021 17:22:30  RUDDY AYON

## 2021-03-10 LAB
ANION GAP SERPL CALC-SCNC: 6 MMOL/L — SIGNIFICANT CHANGE UP (ref 5–17)
BUN SERPL-MCNC: 21 MG/DL — SIGNIFICANT CHANGE UP (ref 7–23)
CALCIUM SERPL-MCNC: 8.3 MG/DL — LOW (ref 8.5–10.1)
CHLORIDE SERPL-SCNC: 109 MMOL/L — HIGH (ref 96–108)
CO2 SERPL-SCNC: 25 MMOL/L — SIGNIFICANT CHANGE UP (ref 22–31)
CREAT SERPL-MCNC: 0.76 MG/DL — SIGNIFICANT CHANGE UP (ref 0.5–1.3)
GLUCOSE SERPL-MCNC: 124 MG/DL — HIGH (ref 70–99)
HCT VFR BLD CALC: 34.3 % — LOW (ref 34.5–45)
HGB BLD-MCNC: 11.6 G/DL — SIGNIFICANT CHANGE UP (ref 11.5–15.5)
MAGNESIUM SERPL-MCNC: 1.7 MG/DL — SIGNIFICANT CHANGE UP (ref 1.6–2.6)
MCHC RBC-ENTMCNC: 30.4 PG — SIGNIFICANT CHANGE UP (ref 27–34)
MCHC RBC-ENTMCNC: 33.8 GM/DL — SIGNIFICANT CHANGE UP (ref 32–36)
MCV RBC AUTO: 89.8 FL — SIGNIFICANT CHANGE UP (ref 80–100)
PHOSPHATE SERPL-MCNC: 3.9 MG/DL — SIGNIFICANT CHANGE UP (ref 2.5–4.5)
PLATELET # BLD AUTO: 218 K/UL — SIGNIFICANT CHANGE UP (ref 150–400)
POTASSIUM SERPL-MCNC: 4 MMOL/L — SIGNIFICANT CHANGE UP (ref 3.5–5.3)
POTASSIUM SERPL-SCNC: 4 MMOL/L — SIGNIFICANT CHANGE UP (ref 3.5–5.3)
RBC # BLD: 3.82 M/UL — SIGNIFICANT CHANGE UP (ref 3.8–5.2)
RBC # FLD: 13.5 % — SIGNIFICANT CHANGE UP (ref 10.3–14.5)
SODIUM SERPL-SCNC: 140 MMOL/L — SIGNIFICANT CHANGE UP (ref 135–145)
WBC # BLD: 17.39 K/UL — HIGH (ref 3.8–10.5)
WBC # FLD AUTO: 17.39 K/UL — HIGH (ref 3.8–10.5)

## 2021-03-10 RX ORDER — ACETAMINOPHEN 500 MG
1000 TABLET ORAL ONCE
Refills: 0 | Status: COMPLETED | OUTPATIENT
Start: 2021-03-10 | End: 2021-03-10

## 2021-03-10 RX ORDER — PANTOPRAZOLE SODIUM 20 MG/1
40 TABLET, DELAYED RELEASE ORAL DAILY
Refills: 0 | Status: DISCONTINUED | OUTPATIENT
Start: 2021-03-10 | End: 2021-03-14

## 2021-03-10 RX ORDER — SODIUM CHLORIDE 9 MG/ML
1000 INJECTION, SOLUTION INTRAVENOUS ONCE
Refills: 0 | Status: COMPLETED | OUTPATIENT
Start: 2021-03-10 | End: 2021-03-10

## 2021-03-10 RX ORDER — KETOROLAC TROMETHAMINE 30 MG/ML
15 SYRINGE (ML) INJECTION EVERY 6 HOURS
Refills: 0 | Status: DISCONTINUED | OUTPATIENT
Start: 2021-03-10 | End: 2021-03-14

## 2021-03-10 RX ADMIN — Medication 15 MILLIGRAM(S): at 18:34

## 2021-03-10 RX ADMIN — SODIUM CHLORIDE 1000 MILLILITER(S): 9 INJECTION, SOLUTION INTRAVENOUS at 10:46

## 2021-03-10 RX ADMIN — Medication 15 MILLIGRAM(S): at 06:02

## 2021-03-10 RX ADMIN — Medication 100 GRAM(S): at 10:50

## 2021-03-10 RX ADMIN — ALVIMOPAN 12 MILLIGRAM(S): 12 CAPSULE ORAL at 10:58

## 2021-03-10 RX ADMIN — Medication 15 MILLIGRAM(S): at 19:30

## 2021-03-10 RX ADMIN — Medication 400 MILLIGRAM(S): at 10:00

## 2021-03-10 RX ADMIN — ATORVASTATIN CALCIUM 20 MILLIGRAM(S): 80 TABLET, FILM COATED ORAL at 21:25

## 2021-03-10 RX ADMIN — Medication 400 MILLIGRAM(S): at 15:18

## 2021-03-10 RX ADMIN — ALVIMOPAN 12 MILLIGRAM(S): 12 CAPSULE ORAL at 21:25

## 2021-03-10 RX ADMIN — Medication 1 PATCH: at 21:29

## 2021-03-10 RX ADMIN — ENOXAPARIN SODIUM 40 MILLIGRAM(S): 100 INJECTION SUBCUTANEOUS at 10:01

## 2021-03-10 RX ADMIN — Medication 1 MILLIGRAM(S): at 01:47

## 2021-03-10 RX ADMIN — Medication 15 MILLIGRAM(S): at 05:42

## 2021-03-10 RX ADMIN — PANTOPRAZOLE SODIUM 40 MILLIGRAM(S): 20 TABLET, DELAYED RELEASE ORAL at 10:58

## 2021-03-10 RX ADMIN — Medication 1 PATCH: at 10:58

## 2021-03-10 NOTE — PROGRESS NOTE ADULT - ASSESSMENT
Pt is a 51 year old female with hx of diverticulitis s/p lap to open sigmoidectomy POD1    -Monitor vitals  -Diet: NPO  -Pain control prn  -Maintain NGT poss removal today  -Maintain ambrose cath  -Anti emetic prn  -Continue IVF  -Serial abd exams  -Monitor daily labs   -Encourage oob/ambulation  -Encourage IS use  -DVT ppx    Will discuss with Dr. England, colorectal surgery attending  ALANNA PGY4

## 2021-03-11 LAB
ANION GAP SERPL CALC-SCNC: 6 MMOL/L — SIGNIFICANT CHANGE UP (ref 5–17)
BUN SERPL-MCNC: 16 MG/DL — SIGNIFICANT CHANGE UP (ref 7–23)
CALCIUM SERPL-MCNC: 8.7 MG/DL — SIGNIFICANT CHANGE UP (ref 8.5–10.1)
CHLORIDE SERPL-SCNC: 110 MMOL/L — HIGH (ref 96–108)
CO2 SERPL-SCNC: 26 MMOL/L — SIGNIFICANT CHANGE UP (ref 22–31)
CREAT SERPL-MCNC: 0.56 MG/DL — SIGNIFICANT CHANGE UP (ref 0.5–1.3)
GLUCOSE SERPL-MCNC: 89 MG/DL — SIGNIFICANT CHANGE UP (ref 70–99)
HCT VFR BLD CALC: 29.2 % — LOW (ref 34.5–45)
HGB BLD-MCNC: 9.7 G/DL — LOW (ref 11.5–15.5)
MAGNESIUM SERPL-MCNC: 2 MG/DL — SIGNIFICANT CHANGE UP (ref 1.6–2.6)
MCHC RBC-ENTMCNC: 29.9 PG — SIGNIFICANT CHANGE UP (ref 27–34)
MCHC RBC-ENTMCNC: 33.2 GM/DL — SIGNIFICANT CHANGE UP (ref 32–36)
MCV RBC AUTO: 90.1 FL — SIGNIFICANT CHANGE UP (ref 80–100)
PHOSPHATE SERPL-MCNC: 1.8 MG/DL — LOW (ref 2.5–4.5)
PLATELET # BLD AUTO: 185 K/UL — SIGNIFICANT CHANGE UP (ref 150–400)
POTASSIUM SERPL-MCNC: 3.6 MMOL/L — SIGNIFICANT CHANGE UP (ref 3.5–5.3)
POTASSIUM SERPL-SCNC: 3.6 MMOL/L — SIGNIFICANT CHANGE UP (ref 3.5–5.3)
RBC # BLD: 3.24 M/UL — LOW (ref 3.8–5.2)
RBC # FLD: 13.7 % — SIGNIFICANT CHANGE UP (ref 10.3–14.5)
SODIUM SERPL-SCNC: 142 MMOL/L — SIGNIFICANT CHANGE UP (ref 135–145)
WBC # BLD: 14.05 K/UL — HIGH (ref 3.8–10.5)
WBC # FLD AUTO: 14.05 K/UL — HIGH (ref 3.8–10.5)

## 2021-03-11 RX ADMIN — Medication 15 MILLIGRAM(S): at 15:00

## 2021-03-11 RX ADMIN — ENOXAPARIN SODIUM 40 MILLIGRAM(S): 100 INJECTION SUBCUTANEOUS at 10:01

## 2021-03-11 RX ADMIN — Medication 1 PATCH: at 10:26

## 2021-03-11 RX ADMIN — PANTOPRAZOLE SODIUM 40 MILLIGRAM(S): 20 TABLET, DELAYED RELEASE ORAL at 10:25

## 2021-03-11 RX ADMIN — Medication 15 MILLIGRAM(S): at 00:37

## 2021-03-11 RX ADMIN — Medication 1 PATCH: at 10:25

## 2021-03-11 RX ADMIN — Medication 15 MILLIGRAM(S): at 06:08

## 2021-03-11 RX ADMIN — ALVIMOPAN 12 MILLIGRAM(S): 12 CAPSULE ORAL at 20:45

## 2021-03-11 RX ADMIN — Medication 15 MILLIGRAM(S): at 00:50

## 2021-03-11 RX ADMIN — SODIUM CHLORIDE 100 MILLILITER(S): 9 INJECTION, SOLUTION INTRAVENOUS at 04:25

## 2021-03-11 RX ADMIN — Medication 1 PATCH: at 09:52

## 2021-03-11 RX ADMIN — ALVIMOPAN 12 MILLIGRAM(S): 12 CAPSULE ORAL at 10:25

## 2021-03-11 RX ADMIN — Medication 15 MILLIGRAM(S): at 20:44

## 2021-03-11 RX ADMIN — Medication 15 MILLIGRAM(S): at 14:20

## 2021-03-11 RX ADMIN — Medication 1 PATCH: at 17:34

## 2021-03-11 RX ADMIN — Medication 15 MILLIGRAM(S): at 21:00

## 2021-03-11 RX ADMIN — SODIUM CHLORIDE 100 MILLILITER(S): 9 INJECTION, SOLUTION INTRAVENOUS at 14:00

## 2021-03-11 RX ADMIN — ATORVASTATIN CALCIUM 20 MILLIGRAM(S): 80 TABLET, FILM COATED ORAL at 20:44

## 2021-03-11 NOTE — PROGRESS NOTE ADULT - ASSESSMENT
Pt is a 51 year old female with hx of diverticulitis s/p lap to open sigmoidectomy POD2    Plan:  -Monitor vitals  -Diet: NPO  -Pain control prn  -NGT in place, poss removal today  -possible d/c Mai today  -Anti emetic prn  -Continue IVF  -Serial abd exams  -Monitor daily labs   -Encourage oob/ambulation  -Encourage IS use  -DVT ppx    Will discuss with Dr. England

## 2021-03-12 ENCOUNTER — TRANSCRIPTION ENCOUNTER (OUTPATIENT)
Age: 51
End: 2021-03-12

## 2021-03-12 LAB
ANION GAP SERPL CALC-SCNC: 6 MMOL/L — SIGNIFICANT CHANGE UP (ref 5–17)
BUN SERPL-MCNC: 18 MG/DL — SIGNIFICANT CHANGE UP (ref 7–23)
CALCIUM SERPL-MCNC: 7.9 MG/DL — LOW (ref 8.5–10.1)
CHLORIDE SERPL-SCNC: 111 MMOL/L — HIGH (ref 96–108)
CO2 SERPL-SCNC: 27 MMOL/L — SIGNIFICANT CHANGE UP (ref 22–31)
CREAT SERPL-MCNC: 0.47 MG/DL — LOW (ref 0.5–1.3)
GLUCOSE SERPL-MCNC: 79 MG/DL — SIGNIFICANT CHANGE UP (ref 70–99)
HCT VFR BLD CALC: 24.2 % — LOW (ref 34.5–45)
HGB BLD-MCNC: 8.1 G/DL — LOW (ref 11.5–15.5)
MAGNESIUM SERPL-MCNC: 1.9 MG/DL — SIGNIFICANT CHANGE UP (ref 1.6–2.6)
MCHC RBC-ENTMCNC: 30.5 PG — SIGNIFICANT CHANGE UP (ref 27–34)
MCHC RBC-ENTMCNC: 33.5 GM/DL — SIGNIFICANT CHANGE UP (ref 32–36)
MCV RBC AUTO: 91 FL — SIGNIFICANT CHANGE UP (ref 80–100)
PHOSPHATE SERPL-MCNC: 1.9 MG/DL — LOW (ref 2.5–4.5)
PLATELET # BLD AUTO: 184 K/UL — SIGNIFICANT CHANGE UP (ref 150–400)
POTASSIUM SERPL-MCNC: 3.4 MMOL/L — LOW (ref 3.5–5.3)
POTASSIUM SERPL-SCNC: 3.4 MMOL/L — LOW (ref 3.5–5.3)
RBC # BLD: 2.66 M/UL — LOW (ref 3.8–5.2)
RBC # FLD: 13.5 % — SIGNIFICANT CHANGE UP (ref 10.3–14.5)
SODIUM SERPL-SCNC: 144 MMOL/L — SIGNIFICANT CHANGE UP (ref 135–145)
WBC # BLD: 9.69 K/UL — SIGNIFICANT CHANGE UP (ref 3.8–10.5)
WBC # FLD AUTO: 9.69 K/UL — SIGNIFICANT CHANGE UP (ref 3.8–10.5)

## 2021-03-12 RX ADMIN — SODIUM CHLORIDE 100 MILLILITER(S): 9 INJECTION, SOLUTION INTRAVENOUS at 00:08

## 2021-03-12 RX ADMIN — Medication 1 PATCH: at 10:00

## 2021-03-12 RX ADMIN — Medication 15 MILLIGRAM(S): at 18:58

## 2021-03-12 RX ADMIN — Medication 15 MILLIGRAM(S): at 12:13

## 2021-03-12 RX ADMIN — ATORVASTATIN CALCIUM 20 MILLIGRAM(S): 80 TABLET, FILM COATED ORAL at 20:47

## 2021-03-12 RX ADMIN — SODIUM CHLORIDE 100 MILLILITER(S): 9 INJECTION, SOLUTION INTRAVENOUS at 10:20

## 2021-03-12 RX ADMIN — Medication 15 MILLIGRAM(S): at 17:47

## 2021-03-12 RX ADMIN — Medication 15 MILLIGRAM(S): at 06:11

## 2021-03-12 RX ADMIN — Medication 15 MILLIGRAM(S): at 12:27

## 2021-03-12 RX ADMIN — ALVIMOPAN 12 MILLIGRAM(S): 12 CAPSULE ORAL at 10:11

## 2021-03-12 RX ADMIN — Medication 1 PATCH: at 23:04

## 2021-03-12 RX ADMIN — Medication 1 PATCH: at 10:11

## 2021-03-12 RX ADMIN — PANTOPRAZOLE SODIUM 40 MILLIGRAM(S): 20 TABLET, DELAYED RELEASE ORAL at 10:11

## 2021-03-12 RX ADMIN — ENOXAPARIN SODIUM 40 MILLIGRAM(S): 100 INJECTION SUBCUTANEOUS at 10:11

## 2021-03-12 RX ADMIN — Medication 15 MILLIGRAM(S): at 23:20

## 2021-03-12 RX ADMIN — Medication 1 PATCH: at 06:14

## 2021-03-12 RX ADMIN — Medication 15 MILLIGRAM(S): at 23:05

## 2021-03-12 RX ADMIN — Medication 15 MILLIGRAM(S): at 06:14

## 2021-03-12 NOTE — DISCHARGE NOTE PROVIDER - CARE PROVIDER_API CALL
Marie England)  ColonRectal Surgery; Surgery  [address] [place] [address]  Phone: [phone]  Fax: [phone]  Follow Up Time: 2 weeks

## 2021-03-12 NOTE — DISCHARGE NOTE PROVIDER - HOSPITAL COURSE
Pt is a 51 year old female with hx of diverticulitis s/p lap to open sigmoidectomy with SBR x2,, postop NGT and IVF, pain control, with return of bowel function, NGT removed. ambulating,

## 2021-03-12 NOTE — DISCHARGE NOTE PROVIDER - NSDCCPTREATMENT_GEN_ALL_CORE_FT
PRINCIPAL PROCEDURE  Procedure: Open sigmoidectomy  Findings and Treatment:       SECONDARY PROCEDURE  Procedure: Closure of enterovesical fistula with resection of intestine  Findings and Treatment:     Procedure: Small bowel resection with anastomosis  Findings and Treatment:

## 2021-03-12 NOTE — DISCHARGE NOTE PROVIDER - NSDCMRMEDTOKEN_GEN_ALL_CORE_FT
Crestor 10 mg oral tablet: 1 tab(s) orally once a day  oxycodone-acetaminophen 5 mg-325 mg oral tablet: 1 tab(s) orally every 6 hours, As Needed -for moderate pain MDD:004

## 2021-03-12 NOTE — DISCHARGE NOTE PROVIDER - NSDCCPCAREPLAN_GEN_ALL_CORE_FT
PRINCIPAL DISCHARGE DIAGNOSIS  Diagnosis: Diverticulitis, colon  Assessment and Plan of Treatment:

## 2021-03-12 NOTE — PROGRESS NOTE ADULT - ASSESSMENT
Pt is a 51 year old female with hx of diverticulitis s/p lap to open sigmoidectomy with SBR x2, POD3    Plan:  -Monitor vitals  -Diet: CLD today  -Pain control prn  -Anti emetic prn  -Continue IVF  -Serial abd exams  -Monitor daily labs   -Encourage oob/ambulation  -Encourage IS use  -DVT ppx    Plan discussed with Dr. England

## 2021-03-13 LAB
ANION GAP SERPL CALC-SCNC: 5 MMOL/L — SIGNIFICANT CHANGE UP (ref 5–17)
BUN SERPL-MCNC: 10 MG/DL — SIGNIFICANT CHANGE UP (ref 7–23)
CALCIUM SERPL-MCNC: 8.1 MG/DL — LOW (ref 8.5–10.1)
CHLORIDE SERPL-SCNC: 111 MMOL/L — HIGH (ref 96–108)
CO2 SERPL-SCNC: 25 MMOL/L — SIGNIFICANT CHANGE UP (ref 22–31)
CREAT SERPL-MCNC: 0.45 MG/DL — LOW (ref 0.5–1.3)
GLUCOSE SERPL-MCNC: 98 MG/DL — SIGNIFICANT CHANGE UP (ref 70–99)
HCT VFR BLD CALC: 22.6 % — LOW (ref 34.5–45)
HGB BLD-MCNC: 7.5 G/DL — LOW (ref 11.5–15.5)
MAGNESIUM SERPL-MCNC: 2.1 MG/DL — SIGNIFICANT CHANGE UP (ref 1.6–2.6)
MCHC RBC-ENTMCNC: 30 PG — SIGNIFICANT CHANGE UP (ref 27–34)
MCHC RBC-ENTMCNC: 33.2 GM/DL — SIGNIFICANT CHANGE UP (ref 32–36)
MCV RBC AUTO: 90.4 FL — SIGNIFICANT CHANGE UP (ref 80–100)
PHOSPHATE SERPL-MCNC: 3 MG/DL — SIGNIFICANT CHANGE UP (ref 2.5–4.5)
PLATELET # BLD AUTO: 171 K/UL — SIGNIFICANT CHANGE UP (ref 150–400)
POTASSIUM SERPL-MCNC: 3.3 MMOL/L — LOW (ref 3.5–5.3)
POTASSIUM SERPL-SCNC: 3.3 MMOL/L — LOW (ref 3.5–5.3)
RBC # BLD: 2.5 M/UL — LOW (ref 3.8–5.2)
RBC # FLD: 13.4 % — SIGNIFICANT CHANGE UP (ref 10.3–14.5)
SODIUM SERPL-SCNC: 141 MMOL/L — SIGNIFICANT CHANGE UP (ref 135–145)
WBC # BLD: 6.06 K/UL — SIGNIFICANT CHANGE UP (ref 3.8–10.5)
WBC # FLD AUTO: 6.06 K/UL — SIGNIFICANT CHANGE UP (ref 3.8–10.5)

## 2021-03-13 RX ORDER — POTASSIUM CHLORIDE 20 MEQ
10 PACKET (EA) ORAL ONCE
Refills: 0 | Status: COMPLETED | OUTPATIENT
Start: 2021-03-13 | End: 2021-03-13

## 2021-03-13 RX ORDER — POTASSIUM CHLORIDE 20 MEQ
40 PACKET (EA) ORAL EVERY 4 HOURS
Refills: 0 | Status: COMPLETED | OUTPATIENT
Start: 2021-03-13 | End: 2021-03-13

## 2021-03-13 RX ADMIN — Medication 1 PATCH: at 09:09

## 2021-03-13 RX ADMIN — Medication 1 PATCH: at 09:14

## 2021-03-13 RX ADMIN — Medication 15 MILLIGRAM(S): at 15:31

## 2021-03-13 RX ADMIN — Medication 15 MILLIGRAM(S): at 06:17

## 2021-03-13 RX ADMIN — Medication 100 MILLIEQUIVALENT(S): at 12:03

## 2021-03-13 RX ADMIN — ATORVASTATIN CALCIUM 20 MILLIGRAM(S): 80 TABLET, FILM COATED ORAL at 19:53

## 2021-03-13 RX ADMIN — Medication 15 MILLIGRAM(S): at 16:29

## 2021-03-13 RX ADMIN — Medication 15 MILLIGRAM(S): at 05:36

## 2021-03-13 RX ADMIN — PANTOPRAZOLE SODIUM 40 MILLIGRAM(S): 20 TABLET, DELAYED RELEASE ORAL at 09:14

## 2021-03-13 RX ADMIN — Medication 40 MILLIEQUIVALENT(S): at 14:51

## 2021-03-13 RX ADMIN — Medication 1 PATCH: at 16:57

## 2021-03-13 RX ADMIN — Medication 40 MILLIEQUIVALENT(S): at 16:57

## 2021-03-13 NOTE — PROGRESS NOTE ADULT - ATTENDING COMMENTS
Seen and examined.  No c/o. NGT clamped for ambulation without N/V. Abd pain controlled.  No flatus yet.  AFVSS  NAD  tessa in place, soft, approp tender, no apparent distention  Labs reviewed  POD 2  Clamp NGT and d/c in afternoon if does not develop N/V and residual is low.  Cont IVF.  Ambulation.  Should be able to attempt clears tomorrow AM.  Details of intraop findings reviewed.
Patient was seen and examined this morning.  Tolerating full liquid diet.  Having some bowel function.  Abdomen soft, appropriately tender, nondistended.  Incision with tessa dressing.  Replace electrolytes.  Anticipate discharge home tomorrow.
Pt seen and examined.    Moderate pain.  Has ambulated.  No BM/flatus.    Vital Signs Last 24 Hrs  T(C): 37.3 (10 Mar 2021 08:29), Max: 37.6 (09 Mar 2021 20:51)  T(F): 99.2 (10 Mar 2021 08:29), Max: 99.7 (09 Mar 2021 20:51)  HR: 88 (10 Mar 2021 08:29) (72 - 94)  BP: 111/65 (10 Mar 2021 08:29) (104/64 - 120/64)  BP(mean): --  RR: 16 (10 Mar 2021 08:29) (11 - 19)  SpO2: 92% (10 Mar 2021 08:29) (92% - 100%)  Uo 350   somewhat bloody    IS to <500mL  Abd ND/soft/+ incisional tenderness L side of abdomen    labs reviewed    imp: POD#1 s/p sigmoid colectomy, SB resection x 2, repair of colovesicle fistula  --continue NG given SB resections and extensive adhesionolysis.  awaiting bowel function  --continue ambrose given low pelvic dissection and concern for urinary retention  --PPI given toradol use  --Ambulate, encourage IS  --fluid bolus today for oliguria.

## 2021-03-13 NOTE — PROGRESS NOTE ADULT - ASSESSMENT
Pt is a 51 year old female with hx of diverticulitis s/p lap to open sigmoidectomy with SBR x2, POD4    Plan:  -Monitor vitals  -Diet: will advance to FLD, ADAT  -Pain control prn  -Anti emetic prn  -Continue IVF  -Serial abd exams  -Monitor daily labs   -Monitor bowel function  -Encourage oob/ambulation  -Encourage IS use  -DVT ppx    Plan discussed with Dr. England Pt is a 51 year old female with hx of diverticulitis s/p lap to open sigmoidectomy with SBR x2, POD4    Plan:  -Monitor vitals  -Diet: will advance to FLD, ADAT  -Pain control prn  -Anti emetic prn  -Serial abd exams  -Monitor daily labs   -Monitor bowel function  -Encourage oob/ambulation  -Encourage IS use  -DVT ppx    Plan discussed with Dr. England

## 2021-03-14 ENCOUNTER — TRANSCRIPTION ENCOUNTER (OUTPATIENT)
Age: 51
End: 2021-03-14

## 2021-03-14 VITALS
SYSTOLIC BLOOD PRESSURE: 122 MMHG | TEMPERATURE: 99 F | DIASTOLIC BLOOD PRESSURE: 70 MMHG | RESPIRATION RATE: 16 BRPM | OXYGEN SATURATION: 98 % | HEART RATE: 79 BPM

## 2021-03-14 LAB
ANION GAP SERPL CALC-SCNC: 4 MMOL/L — LOW (ref 5–17)
BUN SERPL-MCNC: 8 MG/DL — SIGNIFICANT CHANGE UP (ref 7–23)
CALCIUM SERPL-MCNC: 8.4 MG/DL — LOW (ref 8.5–10.1)
CHLORIDE SERPL-SCNC: 113 MMOL/L — HIGH (ref 96–108)
CO2 SERPL-SCNC: 27 MMOL/L — SIGNIFICANT CHANGE UP (ref 22–31)
CREAT SERPL-MCNC: 0.5 MG/DL — SIGNIFICANT CHANGE UP (ref 0.5–1.3)
GLUCOSE SERPL-MCNC: 93 MG/DL — SIGNIFICANT CHANGE UP (ref 70–99)
HCT VFR BLD CALC: 22.4 % — LOW (ref 34.5–45)
HGB BLD-MCNC: 7.6 G/DL — LOW (ref 11.5–15.5)
MAGNESIUM SERPL-MCNC: 2.1 MG/DL — SIGNIFICANT CHANGE UP (ref 1.6–2.6)
MCHC RBC-ENTMCNC: 30.4 PG — SIGNIFICANT CHANGE UP (ref 27–34)
MCHC RBC-ENTMCNC: 33.9 GM/DL — SIGNIFICANT CHANGE UP (ref 32–36)
MCV RBC AUTO: 89.6 FL — SIGNIFICANT CHANGE UP (ref 80–100)
PHOSPHATE SERPL-MCNC: 4.1 MG/DL — SIGNIFICANT CHANGE UP (ref 2.5–4.5)
PLATELET # BLD AUTO: 202 K/UL — SIGNIFICANT CHANGE UP (ref 150–400)
POTASSIUM SERPL-MCNC: 3.8 MMOL/L — SIGNIFICANT CHANGE UP (ref 3.5–5.3)
POTASSIUM SERPL-SCNC: 3.8 MMOL/L — SIGNIFICANT CHANGE UP (ref 3.5–5.3)
RBC # BLD: 2.5 M/UL — LOW (ref 3.8–5.2)
RBC # FLD: 13.2 % — SIGNIFICANT CHANGE UP (ref 10.3–14.5)
SODIUM SERPL-SCNC: 144 MMOL/L — SIGNIFICANT CHANGE UP (ref 135–145)
WBC # BLD: 5.76 K/UL — SIGNIFICANT CHANGE UP (ref 3.8–10.5)
WBC # FLD AUTO: 5.76 K/UL — SIGNIFICANT CHANGE UP (ref 3.8–10.5)

## 2021-03-14 RX ADMIN — Medication 15 MILLIGRAM(S): at 12:00

## 2021-03-14 RX ADMIN — Medication 1 PATCH: at 10:11

## 2021-03-14 RX ADMIN — Medication 1 PATCH: at 05:47

## 2021-03-14 RX ADMIN — Medication 15 MILLIGRAM(S): at 11:55

## 2021-03-14 RX ADMIN — Medication 15 MILLIGRAM(S): at 03:06

## 2021-03-14 RX ADMIN — Medication 15 MILLIGRAM(S): at 01:50

## 2021-03-14 RX ADMIN — Medication 1 PATCH: at 10:07

## 2021-03-14 NOTE — PROGRESS NOTE ADULT - ASSESSMENT
51 year old female POD 5 s/p sigmoid colon resection and small bowel resection. Doing well. Plan for discharge home today. Post op instructions reviewed.

## 2021-03-14 NOTE — PROGRESS NOTE ADULT - SUBJECTIVE AND OBJECTIVE BOX
Pt was seen and examined at bedside this morning with surgery team. No acute overnight events reported by nursing staff. Pt offers no new complaints at this time. Denies fever/cp/sob/n/v/d/c. Vitals stable.  NGT 300ml bilious Mai     T(C): 37.3 (03-10-21 @ 04:00), Max: 37.6 (03-09-21 @ 20:51)  HR: 94 (03-10-21 @ 04:00) (72 - 94)  BP: 115/60 (03-10-21 @ 04:00) (104/64 - 120/64)  RR: 18 (03-10-21 @ 04:00) (11 - 19)  SpO2: 95% (03-10-21 @ 04:00) (95% - 100%)    PHYSICAL EXAM:  Constitutional: NAD, GCS: 15/15  AOX3  Eyes:  WNL  ENMT:  WNL  Neck:  WNL, non tender  Back: Non tender  Respiratory: CTABL  Cardiovascular:  S1+S2+0  Gastrointestinal: Soft, ND , NT  Genitourinary:  WNL  Extremities: NV intact  Vascular:  Intact  Neurological: No focal neurological deficit,  CN, motor and sensory system grossly intact.  Skin: WNL  Musculoskeletal: WNL  Psychiatric: Grossly WNL  I&O's Detail    09 Mar 2021 07:01  -  10 Mar 2021 07:00  --------------------------------------------------------  IN:    Lactated Ringers: 225 mL    Other (mL): 2300 mL  Total IN: 2525 mL    OUT:    Estimated Blood Loss (mL): 150 mL    Indwelling Catheter - Urethral (mL): 350 mL    Other (mL): 300 mL  Total OUT: 800 mL    Total NET: 1725 mL                              11.6   17.39 )-----------( 218      ( 10 Mar 2021 06:27 )             34.3     03-10    140  |  109<H>  |  21  ----------------------------<  124<H>  4.0   |  25  |  0.76    Ca    8.3<L>      10 Mar 2021 06:27  Phos  3.9     03-10  Mg     1.7     03-10            
Pt was seen and examined at bedside this morning with surgery team. No acute overnight events reported by nursing staff. Pt offers no new complaints at this time. NGT was removed yesterday. Pt has been out of bed/ambulation. Admits to passing flatus and having BMs. Denies fever/cp/sob/n/v/d/c. Vitals stable.    Vital Signs Last 24 Hrs  T(C): 37.4 (12 Mar 2021 05:48), Max: 37.7 (11 Mar 2021 21:21)  T(F): 99.4 (12 Mar 2021 05:48), Max: 99.9 (11 Mar 2021 21:21)  HR: 95 (12 Mar 2021 05:48) (95 - 97)  BP: 117/62 (12 Mar 2021 05:48) (107/60 - 117/62)  BP(mean): --  RR: 18 (12 Mar 2021 05:48) (18 - 18)  SpO2: 93% (12 Mar 2021 05:48) (93% - 94%)    PHYSICAL EXAM:  Constitutional: NAD, GCS: 15/15  AOX3  Eyes:  WNL  ENMT:  WNL  Neck:  WNL, non tender  Back: Non tender  Respiratory: CTABL  Cardiovascular:  S1+S2+0  Gastrointestinal: Soft, ND, appropriate tenderness, dressings c/d/i  Genitourinary:  WNL  Extremities: NV intact  Vascular:  Intact  Neurological: No focal neurological deficit,  CN, motor and sensory system grossly intact.  Skin: WNL  Musculoskeletal: WNL  Psychiatric: Grossly WNL    I&O's Detail    11 Mar 2021 07:01  -  12 Mar 2021 07:00  --------------------------------------------------------  IN:  Total IN: 0 mL    OUT:    Indwelling Catheter - Urethral (mL): 300 mL    Nasogastric/Oral tube (mL): 200 mL    Voided (mL): 500 mL  Total OUT: 1000 mL    Total NET: -1000 mL        Labs:                                   8.1    9.69  )-----------( 184      ( 12 Mar 2021 08:33 )             24.2   03-11    142  |  110<H>  |  16  ----------------------------<  89  3.6   |  26  |  0.56    Ca    8.7      11 Mar 2021 08:11  Phos  1.8     03-11  Mg     2.0     03-11            
Doing well. No issues overnight. Reports loose bowel movement. No nausea or vomiting    Exam:  Vital Signs Last 24 Hrs  T(C): 37.1 (14 Mar 2021 08:55), Max: 37.1 (13 Mar 2021 15:57)  T(F): 98.8 (14 Mar 2021 08:55), Max: 98.8 (13 Mar 2021 15:57)  HR: 79 (14 Mar 2021 08:55) (78 - 80)  BP: 122/70 (14 Mar 2021 08:55) (96/56 - 122/70)  RR: 16 (14 Mar 2021 08:55) (16 - 18)  SpO2: 98% (14 Mar 2021 08:55) (96% - 98%)    In no distress  Non labored breathing  Abdomen soft, non distended, non tender. Incision clean and intact without signs or infection  Alert and oriented x 3                          7.6    5.76  )-----------( 202      ( 14 Mar 2021 07:23 )             22.4   03-14    144  |  113<H>  |  8   ----------------------------<  93  3.8   |  27  |  0.50    Ca    8.4<L>      14 Mar 2021 07:23  Phos  4.1     03-14  Mg     2.1     03-14    
Pt was seen and examined at bedside this morning with surgery team. No acute overnight events reported by nursing staff. Pt offers no new complaints at this time. Patient reports passing flatus and having multiple BMs. Tolerated CLD. Pt has been out of bed and ambulating. Denies fever/cp/sob/n/v/d/c. Vitals stable.    Vital Signs Last 24 Hrs  T(C): 37.1 (13 Mar 2021 08:30), Max: 37.3 (12 Mar 2021 20:44)  T(F): 98.8 (13 Mar 2021 08:30), Max: 99.1 (12 Mar 2021 20:44)  HR: 82 (13 Mar 2021 08:30) (82 - 88)  BP: 108/66 (13 Mar 2021 08:30) (94/60 - 108/66)  BP(mean): --  RR: 16 (13 Mar 2021 08:30) (16 - 18)  SpO2: 94% (13 Mar 2021 08:30) (93% - 97%)    PHYSICAL EXAM:  Constitutional: NAD, GCS: 15/15  AOX3  Eyes:  WNL  ENMT:  WNL  Neck:  WNL, non tender  Back: Non tender  Respiratory: CTABL  Cardiovascular:  S1+S2+0  Gastrointestinal: Soft, ND, appropriate tenderness, dressings c/d/i  Genitourinary:  WNL  Extremities: NV intact  Vascular:  Intact  Neurological: No focal neurological deficit,  CN, motor and sensory system grossly intact.  Skin: WNL  Musculoskeletal: WNL  Psychiatric: Grossly WNL      Labs:                                              7.5    6.06  )-----------( 171      ( 13 Mar 2021 07:43 )             22.6   03-13    141  |  111<H>  |  10  ----------------------------<  98  3.3<L>   |  25  |  0.45<L>    Ca    8.1<L>      13 Mar 2021 07:43  Phos  3.0     03-13  Mg     2.1     03-13            
Pt was seen and examined at bedside this morning with surgery team. No acute overnight events reported by nursing staff. Pt offers no new complaints at this time. Pt uncomfortable with NGT in place. Denies fever/cp/sob/n/v/d/c. Vitals stable.    Vital Signs Last 24 Hrs  T(C): 37.1 (11 Mar 2021 05:15), Max: 37.5 (10 Mar 2021 16:04)  T(F): 98.8 (11 Mar 2021 05:15), Max: 99.5 (10 Mar 2021 16:04)  HR: 98 (11 Mar 2021 05:15) (88 - 98)  BP: 105/65 (11 Mar 2021 05:15) (100/58 - 111/65)  BP(mean): --  RR: 18 (11 Mar 2021 05:15) (16 - 18)  SpO2: 94% (11 Mar 2021 05:15) (92% - 94%)    PHYSICAL EXAM:  Constitutional: NAD, GCS: 15/15  AOX3  Eyes:  WNL  ENMT:  WNL  Neck:  WNL, non tender  Back: Non tender  Respiratory: CTABL  Cardiovascular:  S1+S2+0  Gastrointestinal: Soft, ND , NT  Genitourinary:  WNL  Extremities: NV intact  Vascular:  Intact  Neurological: No focal neurological deficit,  CN, motor and sensory system grossly intact.  Skin: WNL  Musculoskeletal: WNL  Psychiatric: Grossly WNL    I&O's Detail    10 Mar 2021 07:01  -  11 Mar 2021 07:00  --------------------------------------------------------  IN:  Total IN: 0 mL    OUT:    Indwelling Catheter - Urethral (mL): 950 mL    Nasogastric/Oral tube (mL): 600 mL  Total OUT: 1550 mL    Total NET: -1550 mL    Labs:                        11.6   17.39 )-----------( 218      ( 10 Mar 2021 06:27 )             34.3     03-10    140  |  109<H>  |  21  ----------------------------<  124<H>  4.0   |  25  |  0.76    Ca    8.3<L>      10 Mar 2021 06:27  Phos  3.9     03-10  Mg     1.7     03-10

## 2021-03-14 NOTE — DISCHARGE NOTE NURSING/CASE MANAGEMENT/SOCIAL WORK - PATIENT PORTAL LINK FT
You can access the FollowMyHealth Patient Portal offered by [facility] by registering at the following website: [email or web address]. By joining [employer]’s FollowMyHealth portal, you will also be able to view your health information using other applications (apps) compatible with our system.

## 2021-03-14 NOTE — DISCHARGE NOTE NURSING/CASE MANAGEMENT/SOCIAL WORK - NSDCPEWEB_GEN_ALL_CORE
[facility] website --- [email or web address] website --- [email or web address]

## 2021-03-18 DIAGNOSIS — R30.9 PAINFUL MICTURITION, UNSPECIFIED: ICD-10-CM

## 2021-03-18 DIAGNOSIS — R19.7 DIARRHEA, UNSPECIFIED: ICD-10-CM

## 2021-03-18 RX ORDER — NEOMYCIN SULFATE 500 MG/1
500 TABLET ORAL
Qty: 6 | Refills: 0 | Status: COMPLETED | COMMUNITY
Start: 2020-09-08 | End: 2021-03-18

## 2021-03-18 RX ORDER — METRONIDAZOLE 500 MG/1
500 TABLET ORAL
Qty: 3 | Refills: 0 | Status: COMPLETED | COMMUNITY
Start: 2020-09-08 | End: 2021-03-18

## 2021-03-22 DIAGNOSIS — Z88.8 ALLERGY STATUS TO OTHER DRUGS, MEDICAMENTS AND BIOLOGICAL SUBSTANCES STATUS: ICD-10-CM

## 2021-03-22 DIAGNOSIS — Z90.710 ACQUIRED ABSENCE OF BOTH CERVIX AND UTERUS: ICD-10-CM

## 2021-03-22 DIAGNOSIS — M19.90 UNSPECIFIED OSTEOARTHRITIS, UNSPECIFIED SITE: ICD-10-CM

## 2021-03-22 DIAGNOSIS — F32.9 MAJOR DEPRESSIVE DISORDER, SINGLE EPISODE, UNSPECIFIED: ICD-10-CM

## 2021-03-22 DIAGNOSIS — K91.71 ACCIDENTAL PUNCTURE AND LACERATION OF A DIGESTIVE SYSTEM ORGAN OR STRUCTURE DURING A DIGESTIVE SYSTEM PROCEDURE: ICD-10-CM

## 2021-03-22 DIAGNOSIS — N73.6 FEMALE PELVIC PERITONEAL ADHESIONS (POSTINFECTIVE): ICD-10-CM

## 2021-03-22 DIAGNOSIS — F41.9 ANXIETY DISORDER, UNSPECIFIED: ICD-10-CM

## 2021-03-22 DIAGNOSIS — G47.00 INSOMNIA, UNSPECIFIED: ICD-10-CM

## 2021-03-22 DIAGNOSIS — N32.1 VESICOINTESTINAL FISTULA: ICD-10-CM

## 2021-03-22 DIAGNOSIS — K57.32 DIVERTICULITIS OF LARGE INTESTINE WITHOUT PERFORATION OR ABSCESS WITHOUT BLEEDING: ICD-10-CM

## 2021-03-22 DIAGNOSIS — F17.210 NICOTINE DEPENDENCE, CIGARETTES, UNCOMPLICATED: ICD-10-CM

## 2021-03-22 DIAGNOSIS — E03.9 HYPOTHYROIDISM, UNSPECIFIED: ICD-10-CM

## 2021-03-22 DIAGNOSIS — E11.9 TYPE 2 DIABETES MELLITUS WITHOUT COMPLICATIONS: ICD-10-CM

## 2021-03-22 DIAGNOSIS — E78.5 HYPERLIPIDEMIA, UNSPECIFIED: ICD-10-CM

## 2021-03-22 DIAGNOSIS — K66.0 PERITONEAL ADHESIONS (POSTPROCEDURAL) (POSTINFECTION): ICD-10-CM

## 2021-03-22 DIAGNOSIS — Z28.21 IMMUNIZATION NOT CARRIED OUT BECAUSE OF PATIENT REFUSAL: ICD-10-CM

## 2021-03-22 DIAGNOSIS — Z79.2 LONG TERM (CURRENT) USE OF ANTIBIOTICS: ICD-10-CM
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
## 2021-03-22 DIAGNOSIS — Z88.1 ALLERGY STATUS TO OTHER ANTIBIOTIC AGENTS STATUS: ICD-10-CM

## 2021-03-23 LAB
APPEARANCE: CLEAR
BILIRUBIN URINE: NEGATIVE
BLOOD URINE: NEGATIVE
C DIFF TOXIN B QL PCR REFLEX: NORMAL
COLOR: YELLOW
GDH ANTIGEN: NOT DETECTED
GLUCOSE QUALITATIVE U: NEGATIVE
KETONES URINE: NEGATIVE
LEUKOCYTE ESTERASE URINE: NEGATIVE
NITRITE URINE: NEGATIVE
PH URINE: 5.5
PROTEIN URINE: NEGATIVE
SPECIFIC GRAVITY URINE: 1.02
TOXIN A AND B: NOT DETECTED
UROBILINOGEN URINE: NORMAL

## 2021-03-30 ENCOUNTER — APPOINTMENT (OUTPATIENT)
Dept: COLORECTAL SURGERY | Facility: CLINIC | Age: 51
End: 2021-03-30
Payer: COMMERCIAL

## 2021-03-30 VITALS
RESPIRATION RATE: 14 BRPM | HEIGHT: 65 IN | DIASTOLIC BLOOD PRESSURE: 82 MMHG | SYSTOLIC BLOOD PRESSURE: 123 MMHG | TEMPERATURE: 98 F | OXYGEN SATURATION: 98 % | HEART RATE: 78 BPM | BODY MASS INDEX: 29.32 KG/M2 | WEIGHT: 176 LBS

## 2021-03-30 PROCEDURE — 99024 POSTOP FOLLOW-UP VISIT: CPT

## 2021-03-30 RX ORDER — ROSUVASTATIN CALCIUM 10 MG/1
10 TABLET, FILM COATED ORAL
Qty: 90 | Refills: 0 | Status: ACTIVE | COMMUNITY
Start: 2021-01-14

## 2021-03-30 NOTE — PHYSICAL EXAM
[No Rash or Lesion] : No rash or lesion [Alert] : alert [Oriented to Person] : oriented to person [Oriented to Place] : oriented to place [Oriented to Time] : oriented to time [Calm] : calm [de-identified] : Soft, nontender and nondistended [de-identified] : No apparent distress [de-identified] : Normocephalic atraumatic [de-identified] : Moving all extremities x4

## 2021-03-30 NOTE — HISTORY OF PRESENT ILLNESS
[FreeTextEntry1] : Ms. Neves presents to the office for follow-up from her recent hospitalization 6/24/20 - 6/27/20 For sigmoid diverticulitis with associated abscess and impending colovesical fistula formation.\par \par Since her hospital discharge, she has completed her course of Augmentin.  She does not have much of an appetite but has no issues with nausea and vomiting after eating.  She does note some difficulties with passing stools which is unusual for her as she typically passes stools once daily.  She also notes some occasional left lower quadrant twinges of discomfort but denies significant abdominal pain such as what brought her to the hospital initially.  She also denies any fevers or chills.\par \par 9/8/20  presents to the office for followup. Since her last office visit, she had an attempt at colonoscopy on 8/12/20 but we were unable to complete the procedure secondary to severe angulation of the colon, at likely the level of prior diverticulitis which precluded safe advancement of the scope. She is here today to discuss ongoing mgmt and surgical intervention.\par \par 1/29/21 Ms. Neves presents to the office for followup. She reports continued difficulties with BMs and feeling as if she is not fully evacuating. Colonoscopy attempted 8/2020 was incomplete secondary to tight angulation of sigmoid colon. She reports urinary frequency but no pneumaturia, dysuria or fecaluria. \par \par 3/30/21 Ms. Neves returns to the office for a postoperative visit after undergoing on 3/9/2021 a laparoscopic converted to open sigmoid colon resection, extensive lysis of adhesions and small bowel resection x2.  Since her hospital discharge, she had called the office with reports of diarrhea and urinary pain.  She was sent for a UA as well as to rule out C. difficile, both of which were negative.  She is feeling improved now as compared to when I spoke to her a week and a half earlier over the phone.  Her appetite is slowly improving.  Bowel movements are not as frequent, passed 2-3 times daily and are solidifying.  The urinary pain has resolved.  She is gradually regaining her strength though admits to being easily fatigued.

## 2021-03-30 NOTE — ASSESSMENT
[FreeTextEntry1] : Ms. Melissa moy presents to the office for follow-up after recent hospitalization at [facility] for diverticulitis with abscess formation and colovesical fistula.  Since her hospital discharge, she is feeling overall improved though continues to have mild left lower quadrant discomfort.  She is also experiencing some constipation.\par I have advised her to return to a high-fiber diet, with ample water intake and to add MiraLAX nightly to her bowel regimen.\par With regards to her left lower quadrant discomfort, we will obtain a CT A/P to ensure resolution of the abscess.\par If CT is negative, we plan to proceed with screening colonoscopy.\par The risks/benefits/alternatives for a colonoscopy were discussed. These include a less than 1% risk of bleeding should any polyps be biopsied and/or removed. There is also a less than 0.1% risk of perforation. The patient understands the need to adhere to a clear liquid diet the day prior to procedure as well as having to perform a bowel prep in order to allow for adequate visualization of the mucosal surfaces.  Followup colonoscopies will be scheduled based on the findings that are seen at the time of the procedure. Patient understands and is agreeable, and will proceed with consent and scheduling.\par If CT demonstrates persistent or worsening disease, I will notify her of this result and we will need to change our above plans to possibly proceed with surgery sooner than expected without colonoscopy versus using IV antibiotics as a bridge to elective resection.\par She understands, and is agreeable.\par \par 9/8/20 Ms. Neves presents to the office to discuss laparoscopic sigmoidectomy.  We were unable to complete colonoscopy secondary to severe angulation of the diseased sigmoid colon.  She is concerned of missed cancers upstream, but I have reassured her that should there have been a sizable malignancy in the remainder of the colon, it likely would be clinically symptomatic and identifiable to a certain extent on her prior imaging studies.  She was advised that in situations where surgical intervention precedes colonoscopic evaluation, follow-up colonoscopies will need to be performed to clear the remainder of the colon of polyps. \par We discussed the laparoscopic plan of approach with possible conversion to open should the operation not proceed in as timely a manner as expected.  The anatomy was defined and the risk of anastomotic leak was detailed. Leak rate is  ~5% despite our efforts to ensure a healthy, properly oriented, well perfused anastomosis that is not under tension is created.  Should such  a complication arise, depending on the magnitude, percutaneous drainage, diverging ileostomy or formation of an end colostomy are all options that will be pursued to treat the pelvic sepsis. Postoperative complications such just wound infection, dehiscence, prolonged postoperative ileus were all addressed. Duration of surgery, length of hospitalization, criterion for discharge, length of convalescence were detailed. \par Duration of surgery, length of hospitalization, criterion for discharge, length of convalescence were detailed. We also discussed the need to complete an oral abx and mechanical bowel prep prior to surgery, in addition to obtaining medical clearance and presurgical testing. After all questions were addressed, they were in agreement to proceed with scheduling.\par \par 1/29/21 Ms. Neves presents to the office for followup. Since her last office visit, she continues to have issues with bowel movements and is now more amenable to surgical intervention. \par Given the patient's disease progression with worsening symptoms, elective major surgery with multiple risks was discussed. We discussed the laparoscopic plan of approach with possible conversion to open should the operation not proceed in as timely a manner as expected.  The anatomy was defined and the risk of anastomotic leak was detailed. Leak rate is  ~5% despite our efforts to ensure a healthy, properly oriented, well perfused anastomosis that is not under tension is created.  Should such  a complication arise, depending on the magnitude, percutaneous drainage, diverging ileostomy or formation of an end colostomy are all options that will be pursued to treat the pelvic sepsis. Postoperative complications such just wound infection, dehiscence, prolonged postoperative ileus were all addressed. Duration of surgery, length of hospitalization, criterion for discharge, length of convalescence were detailed. \par Duration of surgery, length of hospitalization, criterion for discharge, length of convalescence were detailed. We also discussed the need to complete an oral abx and mechanical bowel prep prior to surgery, in addition to obtaining medical clearance and presurgical testing. After all questions were addressed, they were in agreement to proceed with scheduling.\par She requests a preop CT to assess the prior visualized possible colovescial fistula. Order has been placed.\par \par 3/30/21 Ms. Neves returns to the office for a postoperative visit.  She is feeling improved with resolution of urinary pain as well as diarrhea.  Appetite is slowly increasing and she is tolerating p.o. without nausea vomiting or abdominal pain.  She is passing stools 2-3 times daily, increasingly solidified in consistency.  At this point, I have liberalized her diet and reassured her that she should be able to eat foods containing seeds without concern.  She should slowly increase her activity to build up her stamina.  Advised her to return to the office in approximately 1 month's time, and at that point, we can discuss having her repeat a colonoscopy now that the site of obstruction at the sigmoid colon has been removed.  Patient understands, and is agreeable.\par

## 2021-04-21 ENCOUNTER — APPOINTMENT (OUTPATIENT)
Dept: COLORECTAL SURGERY | Facility: CLINIC | Age: 51
End: 2021-04-21
Payer: COMMERCIAL

## 2021-04-21 VITALS
DIASTOLIC BLOOD PRESSURE: 84 MMHG | BODY MASS INDEX: 26.66 KG/M2 | HEART RATE: 87 BPM | TEMPERATURE: 97.2 F | WEIGHT: 160 LBS | HEIGHT: 65 IN | RESPIRATION RATE: 15 BRPM | OXYGEN SATURATION: 100 % | SYSTOLIC BLOOD PRESSURE: 125 MMHG

## 2021-04-21 DIAGNOSIS — Z12.12 ENCOUNTER FOR SCREENING FOR MALIGNANT NEOPLASM OF COLON: ICD-10-CM

## 2021-04-21 DIAGNOSIS — Z12.11 ENCOUNTER FOR SCREENING FOR MALIGNANT NEOPLASM OF COLON: ICD-10-CM

## 2021-04-21 DIAGNOSIS — Z09 ENCOUNTER FOR FOLLOW-UP EXAMINATION AFTER COMPLETED TREATMENT FOR CONDITIONS OTHER THAN MALIGNANT NEOPLASM: ICD-10-CM

## 2021-04-21 PROCEDURE — 99024 POSTOP FOLLOW-UP VISIT: CPT

## 2021-04-21 RX ORDER — VANCOMYCIN HYDROCHLORIDE 125 MG/1
125 CAPSULE ORAL
Qty: 40 | Refills: 0 | Status: COMPLETED | COMMUNITY
Start: 2021-03-18 | End: 2021-04-21

## 2021-04-21 RX ORDER — OXYCODONE AND ACETAMINOPHEN 5; 325 MG/1; MG/1
5-325 TABLET ORAL
Qty: 20 | Refills: 0 | Status: COMPLETED | COMMUNITY
Start: 2021-03-12 | End: 2021-04-21

## 2021-04-21 RX ORDER — SULFAMETHOXAZOLE AND TRIMETHOPRIM 800; 160 MG/1; MG/1
800-160 TABLET ORAL TWICE DAILY
Qty: 14 | Refills: 0 | Status: COMPLETED | COMMUNITY
Start: 2021-03-18 | End: 2021-04-21

## 2021-04-21 RX ORDER — PHENAZOPYRIDINE 200 MG/1
200 TABLET, FILM COATED ORAL 3 TIMES DAILY
Qty: 6 | Refills: 0 | Status: COMPLETED | COMMUNITY
Start: 2021-03-18 | End: 2021-04-21

## 2021-04-21 RX ORDER — ERGOCALCIFEROL 1.25 MG/1
1.25 MG CAPSULE, LIQUID FILLED ORAL
Qty: 4 | Refills: 0 | Status: COMPLETED | COMMUNITY
Start: 2020-11-13 | End: 2021-04-21

## 2021-04-21 NOTE — REASON FOR VISIT
[Post Op: _________] : a [unfilled] post op visit [FreeTextEntry1] : 3/9/21 Sigmoidectomy, TD colovesical fistula, SBR x 2

## 2021-04-21 NOTE — PHYSICAL EXAM
[No Rash or Lesion] : No rash or lesion [Alert] : alert [Oriented to Person] : oriented to person [Oriented to Place] : oriented to place [Oriented to Time] : oriented to time [Calm] : calm [de-identified] : No apparent distress [de-identified] : Soft, nontender and nondistended [de-identified] : Normocephalic atraumatic [de-identified] : Moving all extremities x4

## 2021-04-21 NOTE — ASSESSMENT
[FreeTextEntry1] : Ms. Melissa moy presents to the office for follow-up after recent hospitalization at [facility] for diverticulitis with abscess formation and colovesical fistula.  Since her hospital discharge, she is feeling overall improved though continues to have mild left lower quadrant discomfort.  She is also experiencing some constipation.\par I have advised her to return to a high-fiber diet, with ample water intake and to add MiraLAX nightly to her bowel regimen.\par With regards to her left lower quadrant discomfort, we will obtain a CT A/P to ensure resolution of the abscess.\par If CT is negative, we plan to proceed with screening colonoscopy.\par The risks/benefits/alternatives for a colonoscopy were discussed. These include a less than 1% risk of bleeding should any polyps be biopsied and/or removed. There is also a less than 0.1% risk of perforation. The patient understands the need to adhere to a clear liquid diet the day prior to procedure as well as having to perform a bowel prep in order to allow for adequate visualization of the mucosal surfaces.  Followup colonoscopies will be scheduled based on the findings that are seen at the time of the procedure. Patient understands and is agreeable, and will proceed with consent and scheduling.\par If CT demonstrates persistent or worsening disease, I will notify her of this result and we will need to change our above plans to possibly proceed with surgery sooner than expected without colonoscopy versus using IV antibiotics as a bridge to elective resection.\par She understands, and is agreeable.\par \par 9/8/20 Ms. Neves presents to the office to discuss laparoscopic sigmoidectomy.  We were unable to complete colonoscopy secondary to severe angulation of the diseased sigmoid colon.  She is concerned of missed cancers upstream, but I have reassured her that should there have been a sizable malignancy in the remainder of the colon, it likely would be clinically symptomatic and identifiable to a certain extent on her prior imaging studies.  She was advised that in situations where surgical intervention precedes colonoscopic evaluation, follow-up colonoscopies will need to be performed to clear the remainder of the colon of polyps. \par We discussed the laparoscopic plan of approach with possible conversion to open should the operation not proceed in as timely a manner as expected.  The anatomy was defined and the risk of anastomotic leak was detailed. Leak rate is  ~5% despite our efforts to ensure a healthy, properly oriented, well perfused anastomosis that is not under tension is created.  Should such  a complication arise, depending on the magnitude, percutaneous drainage, diverging ileostomy or formation of an end colostomy are all options that will be pursued to treat the pelvic sepsis. Postoperative complications such just wound infection, dehiscence, prolonged postoperative ileus were all addressed. Duration of surgery, length of hospitalization, criterion for discharge, length of convalescence were detailed. \par Duration of surgery, length of hospitalization, criterion for discharge, length of convalescence were detailed. We also discussed the need to complete an oral abx and mechanical bowel prep prior to surgery, in addition to obtaining medical clearance and presurgical testing. After all questions were addressed, they were in agreement to proceed with scheduling.\par \par 1/29/21 Ms. Neves presents to the office for followup. Since her last office visit, she continues to have issues with bowel movements and is now more amenable to surgical intervention. \par Given the patient's disease progression with worsening symptoms, elective major surgery with multiple risks was discussed. We discussed the laparoscopic plan of approach with possible conversion to open should the operation not proceed in as timely a manner as expected.  The anatomy was defined and the risk of anastomotic leak was detailed. Leak rate is  ~5% despite our efforts to ensure a healthy, properly oriented, well perfused anastomosis that is not under tension is created.  Should such  a complication arise, depending on the magnitude, percutaneous drainage, diverging ileostomy or formation of an end colostomy are all options that will be pursued to treat the pelvic sepsis. Postoperative complications such just wound infection, dehiscence, prolonged postoperative ileus were all addressed. Duration of surgery, length of hospitalization, criterion for discharge, length of convalescence were detailed. \par Duration of surgery, length of hospitalization, criterion for discharge, length of convalescence were detailed. We also discussed the need to complete an oral abx and mechanical bowel prep prior to surgery, in addition to obtaining medical clearance and presurgical testing. After all questions were addressed, they were in agreement to proceed with scheduling.\par She requests a preop CT to assess the prior visualized possible colovescial fistula. Order has been placed.\par \par 3/30/21 Ms. Neves returns to the office for a postoperative visit.  She is feeling improved with resolution of urinary pain as well as diarrhea.  Appetite is slowly increasing and she is tolerating p.o. without nausea vomiting or abdominal pain.  She is passing stools 2-3 times daily, increasingly solidified in consistency.  At this point, I have liberalized her diet and reassured her that she should be able to eat foods containing seeds without concern.  She should slowly increase her activity to build up her stamina.  Advised her to return to the office in approximately 1 month's time, and at that point, we can discuss having her repeat a colonoscopy now that the site of obstruction at the sigmoid colon has been removed.  Patient understands, and is agreeable.\par \par \par 4/21/21 Ms. Neves returns to the office for a postop visit.  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  I have advised her to avoid oily or greasy foods, to increase dietary fiber intake and to eat small but frequent meals.  In addition, preoperatively, we were unable to complete a colonoscopy secondary to her sigmoid stricture/fistula.  We will plan on having this completed at least 3 months from the time of surgery which is in June 2021.  She is agreeable with this.\par The risks/benefits/alternatives for a colonoscopy were discussed. These include a less than 1% risk of bleeding should any polyps be biopsied and/or removed. There is also a less than 0.1% risk of perforation. The patient understands the need to adhere to a clear liquid diet the day prior to procedure as well as having to perform a bowel prep in order to allow for adequate visualization of the mucosal surfaces.  Followup colonoscopies will be scheduled based on the findings that are seen at the time of the procedure. Patient understands and is agreeable, and will proceed with consent and scheduling.\par

## 2021-04-21 NOTE — HISTORY OF PRESENT ILLNESS
[FreeTextEntry1] : Ms. Neves presents to the office for follow-up from her recent hospitalization 6/24/20 - 6/27/20 For sigmoid diverticulitis with associated abscess and impending colovesical fistula formation.\par \par Since her hospital discharge, she has completed her course of Augmentin.  She does not have much of an appetite but has no issues with nausea and vomiting after eating.  She does note some difficulties with passing stools which is unusual for her as she typically passes stools once daily.  She also notes some occasional left lower quadrant twinges of discomfort but denies significant abdominal pain such as what brought her to the hospital initially.  She also denies any fevers or chills.\par \par 9/8/20  presents to the office for followup. Since her last office visit, she had an attempt at colonoscopy on 8/12/20 but we were unable to complete the procedure secondary to severe angulation of the colon, at likely the level of prior diverticulitis which precluded safe advancement of the scope. She is here today to discuss ongoing mgmt and surgical intervention.\par \par 1/29/21 Ms. Neves presents to the office for followup. She reports continued difficulties with BMs and feeling as if she is not fully evacuating. Colonoscopy attempted 8/2020 was incomplete secondary to tight angulation of sigmoid colon. She reports urinary frequency but no pneumaturia, dysuria or fecaluria. \par \par 3/30/21 Ms. Neves returns to the office for a postoperative visit after undergoing on 3/9/2021 a laparoscopic converted to open sigmoid colon resection, extensive lysis of adhesions and small bowel resection x2.  Since her hospital discharge, she had called the office with reports of diarrhea and urinary pain.  She was sent for a UA as well as to rule out C. difficile, both of which were negative.  She is feeling improved now as compared to when I spoke to her a week and a half earlier over the phone.  Her appetite is slowly improving.  Bowel movements are not as frequent, passed 2-3 times daily and are solidifying.  The urinary pain has resolved.  She is gradually regaining her strength though admits to being easily fatigued.\par \par 4/21/21 Ms Neves returns to the office for a POV after undergoing on 3/9/2021 a laparoscopic converted to open sigmoid colon resection, extensive lysis of adhesions and small bowel resection x2.  Since her last office appointment, she reports passing bowel movements at least twice daily, but on occasion, particularly when eating meals such as chicken wings for grilled cheese burger, she has experienced abdominal cramping with subsequent fecal urgency and diarrhea.  As a result, she reports some fear of eating.  There is no other issues with nausea and vomiting.

## 2021-05-14 NOTE — H&P PST ADULT - DOES PATIENT HAVE ADVANCE DIRECTIVE
SUBJECTIVE / OVERNIGHT EVENTS:    no events overnight  pain subsiding  patient seen and examined  denies cp/sob  no n/v  + loose stools  limiting PO intake - advised patient to try and eat more than crackers for lunch    --------------------------------------------------------------------------------------------  LABS:                        13.0   7.87  )-----------( 291      ( 11 May 2021 06:53 )             39.3     05-11    142  |  108  |  5<L>  ----------------------------<  90  3.8   |  20<L>  |  0.88    Ca    8.8      11 May 2021 06:53    TPro  6.7  /  Alb  4.0  /  TBili  0.3  /  DBili  x   /  AST  20  /  ALT  18  /  AlkPhos  64  05-10      CAPILLARY BLOOD GLUCOSE            Urinalysis Basic - ( 10 May 2021 16:13 )    Color: Yellow / Appearance: Clear / S.027 / pH: x  Gluc: x / Ketone: Trace  / Bili: Negative / Urobili: Negative   Blood: x / Protein: 100 / Nitrite: Negative   Leuk Esterase: Negative / RBC: x / WBC x   Sq Epi: x / Non Sq Epi: x / Bacteria: x        RADIOLOGY & ADDITIONAL TESTS:    Imaging Personally Reviewed:  [x] YES  [ ] NO    Consultant(s) Notes Reviewed:  [x] YES  [ ] NO    MEDICATIONS  (STANDING):  citalopram 20 milliGRAM(s) Oral daily  gabapentin 600 milliGRAM(s) Oral daily  loratadine 10 milliGRAM(s) Oral daily  valACYclovir 1000 milliGRAM(s) Oral daily    MEDICATIONS  (PRN):  acetaminophen   Tablet .. 650 milliGRAM(s) Oral every 6 hours PRN Mild Pain (1 - 3)  traMADol 25 milliGRAM(s) Oral every 6 hours PRN Moderate Pain (4 - 6)      Care Discussed with Consultants/Other Providers [x] YES  [ ] NO    Vital Signs Last 24 Hrs  T(C): 36.8 (11 May 2021 08:31), Max: 37.2 (10 May 2021 13:37)  T(F): 98.3 (11 May 2021 08:31), Max: 99 (10 May 2021 20:54)  HR: 73 (11 May 2021 08:31) (73 - 84)  BP: 110/72 (11 May 2021 08:31) (110/72 - 127/91)  BP(mean): --  RR: 18 (11 May 2021 08:31) (16 - 18)  SpO2: 96% (11 May 2021 08:31) (96% - 98%)  I&O's Summary    PHYSICAL EXAM:  GENERAL: NAD, well-developed, comfortable  HEAD:  Atraumatic, Normocephalic  EYES: EOMI, PERRLA, conjunctiva and sclera clear  NECK: Supple, No JVD  CHEST/LUNG: Clear to auscultation bilaterally; No wheeze  HEART: Regular rate and rhythm; No murmurs, rubs, or gallops  ABDOMEN: Soft, mild TTP to LLQ, Nondistended; Bowel sounds present  NEURO: AAOx3, no focal weakness, 5/5 b/l extremity strength, b/l knee no arthritis, no effusion   EXTREMITIES:  2+ Peripheral Pulses, No clubbing, cyanosis, or edema  SKIN: No rashes or lesions       See procedure note for full detail.  Benign upper endoscopy biopsied for celiac/ H. pylori.  Further workup for MALS in progress. Apprec surgical consult.  Diet as tolerated    [phone] Had a few loose BM this morning  No BRBPR  She again experienced generalized abd discomfort after consuming dry food this morning    Vital Signs Last 24 Hrs  T(C): 37.3 (12 May 2021 08:42), Max: 37.3 (12 May 2021 08:42)  T(F): 99.1 (12 May 2021 08:42), Max: 99.1 (12 May 2021 08:42)  HR: 68 (12 May 2021 08:42) (68 - 88)  BP: 106/70 (12 May 2021 08:42) (106/70 - 113/72)  BP(mean): --  RR: 18 (12 May 2021 08:42) (18 - 18)  SpO2: 96% (12 May 2021 08:42) (96% - 97%)    I&O's Summary      PHYSICAL EXAM:  GENERAL: NAD, well-developed, comfortable  HEAD:  Atraumatic, Normocephalic  EYES: EOMI, PERRLA, conjunctiva and sclera clear  NECK: Supple, No JVD  CHEST/LUNG: Clear to auscultation bilaterally; No wheeze  HEART: Regular rate and rhythm; No murmurs, rubs, or gallops  ABDOMEN: Soft, mild TTP to LLQ, Nondistended; Bowel sounds present  NEURO: AAOx3, no focal weakness, 5/5 b/l extremity strength, b/l knee no arthritis, no effusion   EXTREMITIES:  2+ Peripheral Pulses, No clubbing, cyanosis, or edema  SKIN: No rashes or lesions    LABS:                        12.9   7.35  )-----------( 293      ( 12 May 2021 06:54 )             39.3     05-12    141  |  107  |  5<L>  ----------------------------<  97  4.2   |  23  |  0.89    Ca    8.8      12 May 2021 06:56    TPro  5.9<L>  /  Alb  3.6  /  TBili  0.3  /  DBili  x   /  AST  17  /  ALT  15  /  AlkPhos  58  05-12      CAPILLARY BLOOD GLUCOSE            Urinalysis Basic - ( 10 May 2021 16:13 )    Color: Yellow / Appearance: Clear / S.027 / pH: x  Gluc: x / Ketone: Trace  / Bili: Negative / Urobili: Negative   Blood: x / Protein: 100 / Nitrite: Negative   Leuk Esterase: Negative / RBC: x / WBC x   Sq Epi: x / Non Sq Epi: x / Bacteria: x        RADIOLOGY & ADDITIONAL TESTS:    Imaging Personally Reviewed:  [x] YES  [ ] NO    Consultant(s) Notes Reviewed:  [x] YES  [ ] NO     Overnight events:   - No acute events    SUBJECTIVE:  No acute complaints. Pain well-controlled.     OBJECTIVE:  Vital Signs Last 24 Hrs  T(C): 37.1 (13 May 2021 23:25), Max: 37.1 (13 May 2021 23:25)  T(F): 98.7 (13 May 2021 23:25), Max: 98.7 (13 May 2021 23:25)  HR: 82 (13 May 2021 23:25) (69 - 82)  BP: 138/88 (13 May 2021 23:25) (118/73 - 138/88)  BP(mean): --  RR: 18 (13 May 2021 23:25) (18 - 18)  SpO2: 97% (13 May 2021 23:25) (97% - 97%)      05-13-21 @ 07:01  -  05-14-21 @ 02:21  --------------------------------------------------------  IN: 740 mL / OUT: 0 mL / NET: 740 mL        Physical Examination:  GEN: NAD, resting quietly  PULM: symmetric chest rise bilaterally, no increased WOB  ABD: soft, mildly tender over epigastrium, nondistended, no rebound or guarding  EXTR: no LE erythema, moving all extremities      LABS:                        12.9   7.35  )-----------( 293      ( 12 May 2021 06:54 )             39.3       05-12    141  |  107  |  5<L>  ----------------------------<  97  4.2   |  23  |  0.89    Ca    8.8      12 May 2021 06:56    TPro  5.9<L>  /  Alb  3.6  /  TBili  0.3  /  DBili  x   /  AST  17  /  ALT  15  /  AlkPhos  58  05-12       Just came back from both EGD and CTA  Feeling comfortable @ the moment    Vital Signs Last 24 Hrs  T(C): 36.7 (14 May 2021 10:03), Max: 37.1 (13 May 2021 23:25)  T(F): 98.1 (14 May 2021 10:03), Max: 98.7 (13 May 2021 23:25)  HR: 62 (14 May 2021 10:03) (62 - 82)  BP: 108/70 (14 May 2021 10:03) (95/66 - 138/88)  BP(mean): --  RR: 16 (14 May 2021 10:03) (16 - 18)  SpO2: 97% (14 May 2021 10:03) (95% - 98%)    I&O's Summary    05-13-21 @ 07:01  -  05-14-21 @ 07:00  --------------------------------------------------------  IN: 740 mL / OUT: 0 mL / NET: 740 mL        PHYSICAL EXAM:  GENERAL: NAD, well-developed, comfortable  HEAD:  NCAT, EOMI  NECK: Supple, No JVD  CHEST/LUNG: Clear to auscultation bilaterally; No wheeze  HEART: Regular rate and rhythm; No murmurs, rubs, or gallops  ABDOMEN: Soft, mild TTP to LLQ, Nondistended; Bowel sounds present  NEURO: AAOx3, no focal weakness, speech/comprehension intact  EXTREMITIES:  2+ Peripheral Pulses, No clubbing, cyanosis, or edema  SKIN: No rashes or lesions    LABS:                        12.6   8.70  )-----------( 311      ( 14 May 2021 06:44 )             38.7     05-14    140  |  106  |  9   ----------------------------<  93  3.9   |  23  |  0.84    Ca    8.6      14 May 2021 06:44        CAPILLARY BLOOD GLUCOSE                RADIOLOGY & ADDITIONAL TESTS:    Imaging Personally Reviewed:  [x] YES  [ ] NO    Consultant(s) Notes Reviewed:  [x] YES  [ ] NO   She ate a bit more food last evening, was able to tolerate    Vital Signs Last 24 Hrs  T(C): 36.8 (13 May 2021 07:25), Max: 36.9 (12 May 2021 23:48)  T(F): 98.2 (13 May 2021 07:25), Max: 98.4 (12 May 2021 23:48)  HR: 69 (13 May 2021 07:25) (66 - 75)  BP: 118/73 (13 May 2021 07:25) (104/63 - 121/62)  BP(mean): --  RR: 18 (13 May 2021 07:25) (18 - 18)  SpO2: 97% (13 May 2021 07:25) (97% - 98%)    I&O's Summary    05-13-21 @ 07:01  -  05-13-21 @ 10:24  --------------------------------------------------------  IN: 280 mL / OUT: 0 mL / NET: 280 mL        PHYSICAL EXAM:  GENERAL: NAD, well-developed, comfortable  HEAD:  Atraumatic, Normocephalic  EYES: EOMI, PERRLA, conjunctiva and sclera clear  NECK: Supple, No JVD  CHEST/LUNG: Clear to auscultation bilaterally; No wheeze  HEART: Regular rate and rhythm; No murmurs, rubs, or gallops  ABDOMEN: Soft, mild TTP to LLQ, Nondistended; Bowel sounds present  NEURO: AAOx3, no focal weakness, 5/5 b/l extremity strength, b/l knee no arthritis, no effusion   EXTREMITIES:  2+ Peripheral Pulses, No clubbing, cyanosis, or edema  SKIN: No rashes or lesions    LABS:                        12.9   7.35  )-----------( 293      ( 12 May 2021 06:54 )             39.3     05-12    141  |  107  |  5<L>  ----------------------------<  97  4.2   |  23  |  0.89    Ca    8.8      12 May 2021 06:56    TPro  5.9<L>  /  Alb  3.6  /  TBili  0.3  /  DBili  x   /  AST  17  /  ALT  15  /  AlkPhos  58  05-12      CAPILLARY BLOOD GLUCOSE                RADIOLOGY & ADDITIONAL TESTS:    Imaging Personally Reviewed:  [x] YES  [ ] NO    Consultant(s) Notes Reviewed:  [x] YES  [ ] NO US Duplex noted ? MALS  MR Entero negative.  ESR normal  Persistent 60-90 minute post prandial abd pain.      PAST MEDICAL & SURGICAL HISTORY:  Arthritis    H/O:  section  x 2        MEDICATIONS  (STANDING):  citalopram 20 milliGRAM(s) Oral daily  gabapentin 600 milliGRAM(s) Oral daily  loratadine 10 milliGRAM(s) Oral daily  valACYclovir 1000 milliGRAM(s) Oral daily    MEDICATIONS  (PRN):  acetaminophen   Tablet .. 650 milliGRAM(s) Oral every 6 hours PRN Mild Pain (1 - 3)  traMADol 25 milliGRAM(s) Oral every 6 hours PRN Moderate Pain (4 - 6)      Allergies    amoxicillin (Hives)  sulfa drugs (Hives)    Intolerances        Review of Systems:    General:  No wt loss, fevers, chills, night sweats,fatigue,   CV:  No pain, palpitations, hypo/hypertension  Resp:  No dyspnea, cough, tachypnea, wheezing  GI: see HPI  :  No pain, bleeding, incontinence, nocturia  Muscle:  No pain, weakness  Neuro:  No weakness, tingling, memory problems  Psych:  No fatigue, insomnia, mood problems, depression  Endocrine:  No polyuria, polydypsia, cold/heat intolerance  Heme:  No petechiae, ecchymosis, easy bruisability  Skin:  No rash, tattoos, scars, edema      Vital Signs:  Vital Signs Last 24 Hrs  T(C): 36.8 (13 May 2021 07:25), Max: 36.9 (12 May 2021 23:48)  T(F): 98.2 (13 May 2021 07:25), Max: 98.4 (12 May 2021 23:48)  HR: 69 (13 May 2021 07:25) (66 - 75)  BP: 118/73 (13 May 2021 07:25) (104/63 - 121/62)  BP(mean): --  RR: 18 (13 May 2021 07:25) (18 - 18)  SpO2: 97% (13 May 2021 07:25) (97% - 98%)  Daily     Daily           PHYSICAL EXAM:    Constitutional: NAD, well-developed  Neck: No LAD, supple  Respiratory: CTA and P  Cardiovascular: S1 and S2, RRR, no M  Gastrointestinal: BS+, soft, NT/ND, neg HSM,  Extremities: No peripheral edema, neg clubbing, cyanosis  Vascular: 2+ peripheral pulses  Neurological: A/O x 3, no focal deficits  Psychiatric: Normal mood, normal affect  Skin: No rashes      LABS:                        12.9   7.35  )-----------( 293      ( 12 May 2021 06:54 )             39.3     05-    141  |  107  |  5<L>  ----------------------------<  97  4.2   |  23  |  0.89    Ca    8.8      12 May 2021 06:56    TPro  5.9<L>  /  Alb  3.6  /  TBili  0.3  /  DBili  x   /  AST  17  /  ALT  15  /  AlkPhos  58  05-12    LIVER FUNCTIONS - ( 12 May 2021 06:56 )  Alb: 3.6 g/dL / Pro: 5.9 g/dL / ALK PHOS: 58 U/L / ALT: 15 U/L / AST: 17 U/L / GGT: x                          on file

## 2021-05-21 ENCOUNTER — OUTPATIENT (OUTPATIENT)
Dept: OUTPATIENT SERVICES | Facility: HOSPITAL | Age: 51
LOS: 1 days | End: 2021-05-21
Payer: COMMERCIAL

## 2021-05-21 VITALS
HEIGHT: 65 IN | TEMPERATURE: 98 F | HEART RATE: 68 BPM | DIASTOLIC BLOOD PRESSURE: 70 MMHG | WEIGHT: 160.94 LBS | SYSTOLIC BLOOD PRESSURE: 120 MMHG | RESPIRATION RATE: 18 BRPM

## 2021-05-21 DIAGNOSIS — Z90.710 ACQUIRED ABSENCE OF BOTH CERVIX AND UTERUS: Chronic | ICD-10-CM

## 2021-05-21 DIAGNOSIS — Z01.818 ENCOUNTER FOR OTHER PREPROCEDURAL EXAMINATION: ICD-10-CM

## 2021-05-21 DIAGNOSIS — Z90.49 ACQUIRED ABSENCE OF OTHER SPECIFIED PARTS OF DIGESTIVE TRACT: Chronic | ICD-10-CM

## 2021-05-21 DIAGNOSIS — Z71.89 OTHER SPECIFIED COUNSELING: ICD-10-CM

## 2021-05-21 DIAGNOSIS — Z98.890 OTHER SPECIFIED POSTPROCEDURAL STATES: Chronic | ICD-10-CM

## 2021-05-21 DIAGNOSIS — Z29.9 ENCOUNTER FOR PROPHYLACTIC MEASURES, UNSPECIFIED: ICD-10-CM

## 2021-05-21 DIAGNOSIS — Z12.11 ENCOUNTER FOR SCREENING FOR MALIGNANT NEOPLASM OF COLON: ICD-10-CM

## 2021-05-21 LAB
ALBUMIN SERPL ELPH-MCNC: 4.7 G/DL — SIGNIFICANT CHANGE UP (ref 3.3–5.2)
ALP SERPL-CCNC: 69 U/L — SIGNIFICANT CHANGE UP (ref 40–120)
ALT FLD-CCNC: 11 U/L — SIGNIFICANT CHANGE UP
ANION GAP SERPL CALC-SCNC: 9 MMOL/L — SIGNIFICANT CHANGE UP (ref 5–17)
AST SERPL-CCNC: 14 U/L — SIGNIFICANT CHANGE UP
BASOPHILS # BLD AUTO: 0.05 K/UL — SIGNIFICANT CHANGE UP (ref 0–0.2)
BASOPHILS NFR BLD AUTO: 0.5 % — SIGNIFICANT CHANGE UP (ref 0–2)
BILIRUB SERPL-MCNC: 0.3 MG/DL — LOW (ref 0.4–2)
BUN SERPL-MCNC: 13 MG/DL — SIGNIFICANT CHANGE UP (ref 8–20)
CALCIUM SERPL-MCNC: 10.4 MG/DL — HIGH (ref 8.6–10.2)
CHLORIDE SERPL-SCNC: 102 MMOL/L — SIGNIFICANT CHANGE UP (ref 98–107)
CO2 SERPL-SCNC: 29 MMOL/L — SIGNIFICANT CHANGE UP (ref 22–29)
CREAT SERPL-MCNC: 0.47 MG/DL — LOW (ref 0.5–1.3)
EOSINOPHIL # BLD AUTO: 0.24 K/UL — SIGNIFICANT CHANGE UP (ref 0–0.5)
EOSINOPHIL NFR BLD AUTO: 2.6 % — SIGNIFICANT CHANGE UP (ref 0–6)
GLUCOSE SERPL-MCNC: 92 MG/DL — SIGNIFICANT CHANGE UP (ref 70–99)
HCT VFR BLD CALC: 42.8 % — SIGNIFICANT CHANGE UP (ref 34.5–45)
HGB BLD-MCNC: 13.8 G/DL — SIGNIFICANT CHANGE UP (ref 11.5–15.5)
IMM GRANULOCYTES NFR BLD AUTO: 0.3 % — SIGNIFICANT CHANGE UP (ref 0–1.5)
LYMPHOCYTES # BLD AUTO: 2.52 K/UL — SIGNIFICANT CHANGE UP (ref 1–3.3)
LYMPHOCYTES # BLD AUTO: 27.6 % — SIGNIFICANT CHANGE UP (ref 13–44)
MCHC RBC-ENTMCNC: 28.7 PG — SIGNIFICANT CHANGE UP (ref 27–34)
MCHC RBC-ENTMCNC: 32.2 GM/DL — SIGNIFICANT CHANGE UP (ref 32–36)
MCV RBC AUTO: 89 FL — SIGNIFICANT CHANGE UP (ref 80–100)
MONOCYTES # BLD AUTO: 0.49 K/UL — SIGNIFICANT CHANGE UP (ref 0–0.9)
MONOCYTES NFR BLD AUTO: 5.4 % — SIGNIFICANT CHANGE UP (ref 2–14)
NEUTROPHILS # BLD AUTO: 5.81 K/UL — SIGNIFICANT CHANGE UP (ref 1.8–7.4)
NEUTROPHILS NFR BLD AUTO: 63.6 % — SIGNIFICANT CHANGE UP (ref 43–77)
PLATELET # BLD AUTO: 307 K/UL — SIGNIFICANT CHANGE UP (ref 150–400)
POTASSIUM SERPL-MCNC: 5.1 MMOL/L — SIGNIFICANT CHANGE UP (ref 3.5–5.3)
POTASSIUM SERPL-SCNC: 5.1 MMOL/L — SIGNIFICANT CHANGE UP (ref 3.5–5.3)
PROT SERPL-MCNC: 8.2 G/DL — SIGNIFICANT CHANGE UP (ref 6.6–8.7)
RBC # BLD: 4.81 M/UL — SIGNIFICANT CHANGE UP (ref 3.8–5.2)
RBC # FLD: 14 % — SIGNIFICANT CHANGE UP (ref 10.3–14.5)
SODIUM SERPL-SCNC: 140 MMOL/L — SIGNIFICANT CHANGE UP (ref 135–145)
WBC # BLD: 9.14 K/UL — SIGNIFICANT CHANGE UP (ref 3.8–10.5)
WBC # FLD AUTO: 9.14 K/UL — SIGNIFICANT CHANGE UP (ref 3.8–10.5)

## 2021-05-21 PROCEDURE — 36415 COLL VENOUS BLD VENIPUNCTURE: CPT

## 2021-05-21 PROCEDURE — 85025 COMPLETE CBC W/AUTO DIFF WBC: CPT

## 2021-05-21 PROCEDURE — G0121 COLON CA SCRN NOT HI RSK IND: CPT | Mod: 58

## 2021-05-21 PROCEDURE — G0463: CPT

## 2021-05-21 PROCEDURE — 80053 COMPREHEN METABOLIC PANEL: CPT

## 2021-05-21 RX ORDER — ROSUVASTATIN CALCIUM 5 MG/1
1 TABLET ORAL
Qty: 0 | Refills: 0 | DISCHARGE

## 2021-05-21 NOTE — H&P PST ADULT - HISTORY OF PRESENT ILLNESS
JORI DUNN is a 51 year old female aox3. Patient presents to [facility] for evaluation for a screening colonoscopy .Patient states in June 2020 she was having a CT scan of the abdomen for urinary symptoms and was found to have inflammation in the colon .Patient was sent to the ER at [facility] she was admitted and was on 4 days of antibiotics. Patient states she  was found to have a "sigmoid diverticulitis with associated abscess patient also had a impending colovesical fistula formation". Patient last colonoscopy was in August 2020 which was incomplete due to angulation of sigmoid colon .Patient reports in March 2021she had  a laparoscopic converted to open sigmoid colon resection with adhesions .Patient is having planned colonoscopy on 5/26/21 with Dr England    JORI DUNN is a 51 year old female aox3. Patient presents to [facility] for evaluation for a screening colonoscopy .Patient states in June 2020 she was having a CT scan of the abdomen for urinary symptoms and was found to have inflammation in the colon .Patient was sent to the ER at [facility] she was admitted and was on 4 days of antibiotics. Patient states she  was found to have a "sigmoid diverticulitis with associated abscess patient also had a impending colovesical fistula formation". Patient last colonoscopy was in August 2020 which was incomplete due to angulation of sigmoid colon .Patient reports in March 2021she had  a laparoscopic converted to open sigmoid colon resection with adhesions .Patient is having planned colonoscopy on 5/26/21 with Dr England

## 2021-05-21 NOTE — H&P PST ADULT - ASSESSMENT
OPIOID RISK TOOL    RICK EACH BOX THAT APPLIES AND ADD TOTALS AT THE END    FAMILY HISTORY OF SUBSTANCE ABUSE                 FEMALE         MALE                                                Alcohol                             [  ]1 pt          [  ]3pts                                               Illegal Drugs                     [  ]2 pts        [  ]3pts                                               Rx Drugs                           [  ]4 pts        [  ]4 pts    PERSONAL HISTORY OF SUBSTANCE ABUSE                                                                                          Alcohol                             [  ]3 pts       [  ]3 pts                                               Illegal Drugs                     [  ]4 pts        [  ]4 pts                                               Rx Drugs                           [  ]5 pts        [  ]5 pts    AGE BETWEEN 16-45 YEARS                                      [  ]1 pt         [  ]1 pt    HISTORY OF PREADOLESCENT   SEXUAL ABUSE                                                             [  ]3 pts        [  ]0pts    PSYCHOLOGICAL DISEASE                     ADD, OCD, Bipolar, Schizophrenia        [  ]2 pts         [  ]2 pts                      Depression                                               [  ]1 pt           [  ]1 pt           SCORING TOTAL   (add numbers and type here)              (*0**)                                   CAPRINI SCORE [CLOT]    AGE RELATED RISK FACTORS                                                       MOBILITY RELATED FACTORS  x ] Age 41-60 years                                            (1 Point)                  [ ] Bed rest                                                        (1 Point)  [ ] Age: 61-74 years                                           (2 Points)                 [ ] Plaster cast                                                   (2 Points)  [ ] Age= 75 years                                              (3 Points)                 [ ] Bed bound for more than 72 hours                 (2 Points)    DISEASE RELATED RISK FACTORS                                               GENDER SPECIFIC FACTORS  [ ] Edema in the lower extremities                       (1 Point)                  [ ] Pregnancy                                                     (1 Point)  [ ] Varicose veins                                               (1 Point)                  [ ] Post-partum < 6 weeks                                   (1 Point)             [x ] BMI > 25 Kg/m2                                            (1 Point)                  [ ] Hormonal therapy  or oral contraception          (1 Point)                 [ ] Sepsis (in the previous month)                        (1 Point)                  [ ] History of pregnancy complications                 (1 point)  [ ] Pneumonia or serious lung disease                                               [ ] Unexplained or recurrent                     (1 Point)           (in the previous month)                               (1 Point)  [ ] Abnormal pulmonary function test                     (1 Point)                 SURGERY RELATED RISK FACTORS  [ ] Acute myocardial infarction                              (1 Point)                 [ ]  Section                                             (1 Point)  [ ] Congestive heart failure (in the previous month)  (1 Point)               [ x] Minor surgery                                                  (1 Point)   [ ] Inflammatory bowel disease                             (1 Point)                 [ ] Arthroscopic surgery                                        (2 Points)  [ ] Central venous access                                      (2 Points)                [ ] General surgery lasting more than 45 minutes   (2 Points)       [ ] Stroke (in the previous month)                          (5 Points)               [ ] Elective arthroplasty                                         (5 Points)                                                                                                                                               HEMATOLOGY RELATED FACTORS                                                 TRAUMA RELATED RISK FACTORS  [ ] Prior episodes of VTE                                     (3 Points)                [ ] Fracture of the hip, pelvis, or leg                       (5 Points)  [ ] Positive family history for VTE                         (3 Points)                 [ ] Acute spinal cord injury (in the previous month)  (5 Points)  [ ] Prothrombin 82541 A                                     (3 Points)                 [ ] Paralysis  (less than 1 month)                             (5 Points)  [ ] Factor V Leiden                                             (3 Points)                  [ ] Multiple Trauma within 1 month                        (5 Points)  [ ] Lupus anticoagulants                                     (3 Points)                                                           [ ] Anticardiolipin antibodies                               (3 Points)                                                       [ ] High homocysteine in the blood                      (3 Points)                                             [ ] Other congenital or acquired thrombophilia      (3 Points)                                                [ ] Heparin induced thrombocytopenia                  (3 Points)                                          Total Score [    3      ]      JORI DUNN is a 51 year old female aox3. Patient presents to [facility] for evaluation for a screening colonoscopy .Patient states in 2020 she was having a CT scan of the abdomen for urinary symptoms and was found to have inflammation in the colon .Patient was sent to the ER at [facility] she was admitted and was on 4 days of antibiotics. Patient states she  was found to have a "sigmoid diverticulitis with associated abscess patient also had a impending colovesical fistula formation". Patient last colonoscopy was in 2020 which was incomplete due to angulation of sigmoid colon .Patient reports in he had  a laparoscopic converted to open sigmoid colon resection with adhesions .Patient is having planned colonoscopy on 21 with Dr England       Patient was educated on preoperative preparation with written and verbal instruction . Patient was educated on aspirin and aspirin products NSAIDs ,vitamins and herbals that increase the risk of bleeding and need to be stopped five days before procedure  . Patient was also educated on covid testing and covid prevention ,social distancing and wearing a mask.

## 2021-05-21 NOTE — H&P PST ADULT - NSICDXPROBLEM_GEN_ALL_CORE_FT
PROBLEM DIAGNOSES  Problem: Encounter for screening for malignant neoplasm of colon  Assessment and Plan: pt is having a screening colonoscopy with Dr England     Problem: Need for prophylactic measure  Assessment and Plan: caprini score ia 3 at VTE risk surgical team to assess the need for pharm proph     Problem: Educated about COVID-19 virus infection  Assessment and Plan: pt educated on testing as well as prevention of covid

## 2021-05-21 NOTE — H&P PST ADULT - GUM GEN PE MLT EXAM PC
Occupational Therapy Video-Visit     This patient contact was made to address the patients Occupational Therapy needs.  He is an established patient of mine. The patient's identity has been established.    mother was informed of the requirements, reasons for the rehab video-visit due to COVID-19 crisis and right to refuse the visit at any time. Patient was informed of the option to be seen for an in-person clinic visit.  In-person clinic visit risk and requirements were reviewed with patient related to COVID-19.     Prior to initiating the virtual visit, the patient's representative (name: Whitney, relationship: Mother) was informed that verbal consent to treat includes permission to submit claims to their insurance on their behalf for the services received.    mother verbally consented to video-visit. mother and patient present throughout the video-visit.  Video-visit was not recorded.     Therapist location: [facility]  Patient location: [place], [place]  Platform used: Zoom    Visit Count: 5    SUBJECTIVE   Felice arrived to visit with his mother who reports that Felice is having a good week. They enjoyed time outside in the snow.   Current Pain (0-10 scale): Patient reports pain is not an issue/concern     OBJECTIVE   Session focused on food exploration with goal of increasing food repertoire using the sequential oral sensory and food chaining methods. Felice explored shay crackers, hummus, spaghetti os and chex    Treatment   Therapeutic Activity:  Feeding therapy consisted of tactile exploration of foods, looking at, smelling and taking small tastes of foods while following steps to eating heirarchy.    Home Program:   Parent to offer \"learning plate\" at at least 3 meals this week, plan to include a wet washcloth during next session and introduce \"all done bowl\" concept    Suggestions for next session as indicated: progress per plan of care, discussed food options for next week.     ASSESSMENT   Felice  engaged well in structured activities today and was observed to independently touch all items today, without being instructed to. He had difficulty with novel foods including hummus and noodles. He was able to interact with noodles with toothpick and eventually was able to use finger tips to string noodles onto toothpick, with frequent hand wiping noted.   Pain after treatment (patient reported, 0-10 scale): Patient reports pain is not an issue/concern   Result of above outlined education: Demonstrates understanding    Procedures and total treatment time documented in Time Entry flowsheet.   patient refused

## 2021-05-25 ENCOUNTER — APPOINTMENT (OUTPATIENT)
Dept: DISASTER EMERGENCY | Facility: CLINIC | Age: 51
End: 2021-05-25

## 2021-05-26 LAB — SARS-COV-2 N GENE NPH QL NAA+PROBE: NOT DETECTED

## 2021-05-27 ENCOUNTER — TRANSCRIPTION ENCOUNTER (OUTPATIENT)
Age: 51
End: 2021-05-27

## 2021-05-28 ENCOUNTER — APPOINTMENT (OUTPATIENT)
Dept: COLORECTAL SURGERY | Facility: HOSPITAL | Age: 51
End: 2021-05-28
Payer: COMMERCIAL

## 2021-05-28 ENCOUNTER — OUTPATIENT (OUTPATIENT)
Dept: OUTPATIENT SERVICES | Facility: HOSPITAL | Age: 51
LOS: 1 days | End: 2021-05-28
Payer: COMMERCIAL

## 2021-05-28 DIAGNOSIS — Z12.11 ENCOUNTER FOR SCREENING FOR MALIGNANT NEOPLASM OF COLON: ICD-10-CM

## 2021-05-28 DIAGNOSIS — Z90.49 ACQUIRED ABSENCE OF OTHER SPECIFIED PARTS OF DIGESTIVE TRACT: Chronic | ICD-10-CM

## 2021-05-28 DIAGNOSIS — Z98.890 OTHER SPECIFIED POSTPROCEDURAL STATES: Chronic | ICD-10-CM

## 2021-05-28 DIAGNOSIS — Z90.710 ACQUIRED ABSENCE OF BOTH CERVIX AND UTERUS: Chronic | ICD-10-CM

## 2021-05-28 PROCEDURE — 45378 DIAGNOSTIC COLONOSCOPY: CPT | Mod: 78

## 2021-05-28 PROCEDURE — G0105: CPT

## 2022-02-17 NOTE — ED STATDOCS - CHPI ED AREA
lower Bactrim Counseling:  I discussed with the patient the risks of sulfa antibiotics including but not limited to GI upset, allergic reaction, drug rash, diarrhea, dizziness, photosensitivity, and yeast infections.  Rarely, more serious reactions can occur including but not limited to aplastic anemia, agranulocytosis, methemoglobinemia, blood dyscrasias, liver or kidney failure, lung infiltrates or desquamative/blistering drug rashes.

## 2022-10-28 NOTE — REASON FOR VISIT
No [Post Op: _________] : a [unfilled] post op visit [FreeTextEntry1] : 3/9/21 Sigmoidectomy, TD colovesical fistula, SBR x 2

## 2022-11-14 NOTE — BRIEF OPERATIVE NOTE - PRIMARY SURGEON
Call jakob and see if Lon Patient goes through, patient  needs this ASAP (leaving town before noon tomorrow and need before they go )
Can you please send in Rx?  Thanks
LINDSEY

## 2022-12-16 NOTE — ED STATDOCS - CCCP TRG CHIEF CMPLNT
TELEPHONE Rheumatology Follow-up    Name: Bon Michael    MRN [id number]   Date of service: 12/16/2022          Reason for Follow-up:  GCA   Requesting physician: Guille Garcia MD        Assessment & Plan:   Assessment:  80 year old male with history of CLL referred to rheumatology for evaluation and treatment of biopsy proven giant cell arteritis with bilateral ischemic optic neuropathy clinically manifested by now stable peripheral vision loss in left>right eye. Never had HA, jaw claudication, PMR symptoms. No inflammatory arthritis symptoms. Disease currently in remission by history on once weekly actemra 162mg weekly (had missed some doses due to national drug shortage) and methotrexate 20mg weekly/folic acid 1mg daily (since feb 2022). Has completed his prednisone taper which was guided by GiACTA trial.    Most recent labs obtained on 12/12/2022 show normal ESR and CRP.  His AST and ALT are normal.  His creatinine is normal.  Images no CLL his WBC count is elevated at baseline.  His mild chronic anemia with his hemoglobin of 12.3.  Platelet count is 119 also stable.  No evidence of toxicity on these labs obtained for assessment of active vasculitis and to assess for routine screening drug toxicity monitoring.  Normal inflammatory markers further support his lack of symptoms and quiet systemic vasculitis by history today.    # Giant cell arteritis   1) continue 162mg once weekly injection, next dose due on Wednesday  2) follow-up in 3 months with labs the week prior    # Long term use of high risk medication: Actemra  1) Actemra 162mg weekly. olerating without issues. No evidence of toxicity by most recent labs.  2) Will continue routine screening drug toxicity labs. Standing orders placed with today's encounter. He will have these drawn along with inflammatory markers in the week prior to his follow-up in 3 months.    # Osteopenia   Managed by PCP.     #CLL  Managed by oncology.      Luke  Maricruz MONTIEL   Rheumatology     Subjective:   December 16, 2022  No HA, scalp tenderness, vision change, jaw claudication symptoms, proximal muscle pain/stiffness. Still with on/off back pain and sinus/nasal congestion, both of which have been chronic issues for years. No fevers/chills/night sweats. No unintentional weight loss. Was treated successfully for cutaneous fungal infection, no return now since this summer. No red/hot/swollen joints. Tolerating actemra injections without noticeable side effects.  Other than his cutaneous fungal infection he denies any other interval infections.  Able to get full fist with me in the morning.  Denies joint stiffness that improves with use.  He feels well.  Had his flu shot recently.  14 point review of systems collected and otherwise negative.          Interval History September 2, 2022  Had dental work done. Saw his PCP for lower extremity rash, treated by PCP with steroids with taper. Though after a few weeks without resolution so was referred to dermatology for lesions on LE. Biopsied/cultured and told it was fungal infection, started on treatment which ended yesterday. Lesions on lower extremities have cleared. No HA/ change in vision/ scalp tenderness/ jaw claudication. No red/hot/swollen joints. Able to make a full fist upon waking in the AM. No proximal muscle pain/stiffness. No injection site reactions/ side effects. 14 point ROS collected and negative if not documented above.     Interval history 6/10/22  No HA, change in vision, scalp tenderness, jaw claudication. No proximal muscle pain/stiffness. No red/hot/swollen joints. No fevers/chills/night sweats. No side effects with methotrexate. Takes methotrexate and actemra on wednesdays. No side effects for either. No interval infections. No rashes.  Weight has been stable.  Able to make a full fist upon waking in the morning.  14 point review of systems collected and otherwise negative.    Interval History  3/11/22  Has continued on alendronate. Thinks he has more sensitivity in his teeth. He asks about need for addition of vit K2 to supplement. He plans to bring this question, along with question about green tea extract up with his primary care physician. Takes this on Wednesday evenings. Taking folic acid 1mg daily. Has only had 3 doses of actemra that were late. Has been on methotrexate for 3 weeks now.  No headache, vision change, scalp tenderness, proximal muscle pain or stiffness, peripheral joint swelling redness warmth.  Able to make full fist upon waking in the morning.  No fevers chills or night sweats.  No interval infections.  No rash.  Is tolerating his methotrexate without any side effects.  GI or other.      Past Medical History  CLL  Hernia  Mono   Deviated septum  Osteopenia by DEXA    Past Surgical History  Tonsillectomy  Deviated septum    Medications:  Actemra 162 mg once weekly subcutaneous      Weeks Daily prednisone dose (mg) 26-week taper Weekly 162mg Actemra      1 60    2 50 11/5/2020    3 40    4 35 11/20/2020   5 30    6 25    7 20 12/10/2020   8 15 12/17/2020   9 12.5    10 12.5    11 10 1/8/2021   12 9    13 8    14 7 2/5/21   15 6 2/11/21   16 6    17 5    18 5 3/4/21   19 4    20 4    21 3    22 3 4/8/21   23 2    24 2    25 1    26 1 5/12/21     Allergies: Seasonal/ environmental allergies    Family History: No family history of autoimmune diseases like RA, sjogrens, SLE, scleroderma, IBD.    Social History: Works as realtor. Never smoker. No ETOH use. No drug use.        Objective:   Physical exam:  No vitals or exam for telephone visit    Labs:   12/12/2022  Creatinine 0.78  CRP 0.02  ALT 21  AST 31  Albumin 4.1  ESR less than 1  WBC 31.7  Hemoglobin 12.3  Platelet 119    CRP  5/5/21  WBC 27.4  HGB 13.6    Cr 0.81  ALT 25  AST 29  Chol 152  Trig 88  HDL 54  LDL 80  CRP <2.9  ESR 3  UA with small blood, no cells, no protein, casts    4/8/21  ESR 4  CRP less than 2.9  WBC  30.1  Hemoglobin 13.6  Platelets 157  Creatinine 0.86  Cholesterol 179  Triglycerides 83  HDL 60      3/2/21  ESR 4  CRP <2.9  WBC 28.3  HGB 12.8    Cr 0.86  ALT 23  AST 25    2/3/21  WBC 30.6  HGB 11.9    Cr 0.83  Alk phos 40  ALT 30  AST 29    1/5/21  UA without protein, cells or casts  ESR 5  CRP <2.9  WBC 40.6  HGB 11.6    Cr 0.81  Chol 166  Trig 86  HDL 68  LDL 81    12/14/2020   CRP <2.9  ESR 4  WBC 54  HGB 11.8    Cr 0.78  Ua unremarkable  Cholesterol 168  Trig 82  HDL 70  LDL 81    11-  ESR 3  CRP less than 2.9  WBC 63.3  Hemoglobin 12  Platelets 181  Creatinine 0.98  Total protein low at 5.9  Alk phos 48  ALT 31  AST 30  UA with 30 of albumin  Cholesterol 169  Triglycerides 45  HDL 83  LDL 77    10/1/2020  WBC 84.9  HGB 12.3    IgM 36 low  IgA 124 normal  IgG 625    Cr 0.98    9/1/2020  WBC 50.2  HGB 11.6       Imaging:  MR brain and orbits 9/10/20  Impression:    1. Regarding the orbits and globes, there is no definite abnormal  contrast enhancement or mass. No evidence of leukemic infiltration.  2. Regarding the remainder of the brain, abnormal T2 hyperintense bone  marrow signal with associated enhancement in the skull, likely  representing leukemic infiltration.  3. Subcutaneous T2 hyperintensity with associated diffusion  restriction and contrast enhancement over the left zygomatic region,  question leukemic infiltration.    Pathology:  Patient Name: MARIELA WALLACE   MR#: [id number]   Specimen #: [id number]   Collected: 9/10/2020   Received: 9/10/2020   Reported: 9/16/2020 12:35   Ordering Phy(s): AGUS RETANA     For improved result formatting, select 'View Enhanced Report Format' under    Linked Documents section.     SPECIMEN(S):   Temporal artery biopsy, left     FINAL DIAGNOSIS:   Temporal artery, left, biopsy:   1. Granulomatous arteritis consistent with giant cell arteritis.   2. Chronic inflammation of the surrounding adventitia.  "    COMMENT:   Preliminary results were communicated to Dr. Valerie Carrington and Dr. Guille Garcia on 9/14/20 at 12:45pm.     I have personally reviewed all specimens and/or slides, including the   listed special stains, and used them   with my medical judgement to determine or confirm the final diagnosis.     Electronically signed out by:     Za García M.D., [facility]     CLINICAL HISTORY:   The patient is a 77 year old male with a history of chronic lymphocytic   leukemia who presented with bilateral   sequential optic neuropathy with associated pallid optic disc edema and   choroidal hypoperfusion, but   relatively intact visual acuity, negative elevation of acute phase   reactants, and lack of constitutional   symptoms consistent with giant cell arteritis. He also has findings of   likely leukemic infiltration in the   skull seen on MRI. He undergoes temporal artery biopsy on the left.     GROSS:   The specimen is received in formalin with proper patient identification,   labeled \"left temporal artery\".  The   specimen consists of a 3.2 cm in length by 0.3 cm in diameter tan-white   vessel segment. The specimen is   submitted intact in A1. (Dictated by: PADILLA Montalvo 9/11/2020 03:08   PM)     MICROSCOPIC:   The tissue consists of artery with narrowed lumen and intimal hyperplasia.    There is an infiltrate of   lymphocytes, plasma cells, epithelioid histiocytes, and multinucleated   giant cells throughout all layers of   the artery. There is associated loss of the internal elastic lamina on   elastic stain. Occasional focal   calcifications are seen a the level of the internal elastic lamina.   Neovascularization of the vessel wall is   present. Perivascular lymphocytic infiltrates are seen in the surrounding   adventitia. Immunohistochemistry   with CD3 and CD20 demonstrates a predominantly T-cell lymphocytic   infiltrate within the vessel wall. There is   a mixed infiltrate of T and B " lymphocytes perivascular in the adventitia.   CD5 and CD43 have similar staining   patterns to CD3. There is light patchy CD23 staining in the areas positive    for CD20. CD68 highlights the   epithelioid histiocytes within the vessel wall.     The technical component of this testing was completed at the [facility], with the professional component performed    at the [facility], [address] [place] [phone] ([phone])     CPT Codes:   A: 96702-OU9, 54480-IEXE, 82122-KCW, 20563-FQT, 84123-FVY, 69426-GJE,   41870-ILJ, 70958-MCL     COLLECTION SITE:   Client: [facility]   Location: [facility]ERNA[facility] FUENTES)        abdominal pain

## 2023-07-25 NOTE — ED STATDOCS - CLINICAL SUMMARY MEDICAL DECISION MAKING FREE TEXT BOX
Pt with sigmoid diverticulitis with abscess formation and colovesical fistula. Pt looks well, is nontoxic and not septal appearing, will give Zosyn as pt is allergic to Cipro, will hydrate, get colorectal consult, and admit to either surgery or medicine
Communicate Risk of Fall with Harm to all staff/Reinforce activity limits and safety measures with patient and family/Tailored Fall Risk Interventions/Visual Cue: Yellow wristband and red socks/Bed in lowest position, wheels locked, appropriate side rails in place/Call bell, personal items and telephone in reach/Instruct patient to call for assistance before getting out of bed or chair/Non-slip footwear when patient is out of bed/[place] to call system/Physically safe environment - no spills, clutter or unnecessary equipment/Purposeful Proactive Rounding/Room/bathroom lighting operational, light cord in reach

## 2023-08-21 NOTE — ED STATDOCS - CARDIAC, MLM
normal rate, regular rhythm, and no murmur. Stelara Pregnancy And Lactation Text: This medication is Pregnancy Category B and is considered safe during pregnancy. It is unknown if this medication is excreted in breast milk.

## 2024-11-11 NOTE — DISCHARGE NOTE NURSING/CASE MANAGEMENT/SOCIAL WORK - NURSING SECTION COMPLETE
Patient/Caregiver provided printed discharge information. Quality 130: Documentation Of Current Medications In The Medical Record: Current Medications Documented Detail Level: Detailed Quality 47: Advance Care Plan: Advance Care Planning discussed and documented; advance care plan or surrogate decision maker documented in the medical record. Quality 226: Preventive Care And Screening: Tobacco Use: Screening And Cessation Intervention: Patient screened for tobacco use and is an ex/non-smoker

## 2025-01-13 NOTE — H&P ADULT - NSHPRISKHIVSCREEN_GEN_ALL_CORE
Addended by: LINDA WALTERS on: 1/13/2025 02:09 PM     Modules accepted: Orders     Offered and patient declined

## 2025-04-02 ENCOUNTER — INPATIENT (INPATIENT)
Facility: HOSPITAL | Age: 55
LOS: 0 days | Discharge: HOME | DRG: 316 | End: 2025-04-03
Attending: INTERNAL MEDICINE | Admitting: INTERNAL MEDICINE
Payer: COMMERCIAL

## 2025-04-02 ENCOUNTER — TRANSCRIPTION ENCOUNTER (OUTPATIENT)
Age: 55
End: 2025-04-02

## 2025-04-02 VITALS
SYSTOLIC BLOOD PRESSURE: 117 MMHG | WEIGHT: 179.9 LBS | RESPIRATION RATE: 14 BRPM | TEMPERATURE: 98 F | HEART RATE: 75 BPM | HEIGHT: 66 IN | DIASTOLIC BLOOD PRESSURE: 77 MMHG | OXYGEN SATURATION: 98 %

## 2025-04-02 DIAGNOSIS — Z98.890 OTHER SPECIFIED POSTPROCEDURAL STATES: Chronic | ICD-10-CM

## 2025-04-02 DIAGNOSIS — Z90.49 ACQUIRED ABSENCE OF OTHER SPECIFIED PARTS OF DIGESTIVE TRACT: Chronic | ICD-10-CM

## 2025-04-02 DIAGNOSIS — Z90.710 ACQUIRED ABSENCE OF BOTH CERVIX AND UTERUS: Chronic | ICD-10-CM

## 2025-04-02 DIAGNOSIS — R94.39 ABNORMAL RESULT OF OTHER CARDIOVASCULAR FUNCTION STUDY: ICD-10-CM

## 2025-04-02 LAB
ANION GAP SERPL CALC-SCNC: 14 MMOL/L — SIGNIFICANT CHANGE UP (ref 5–17)
BUN SERPL-MCNC: 15.7 MG/DL — SIGNIFICANT CHANGE UP (ref 8–20)
CALCIUM SERPL-MCNC: 8.7 MG/DL — SIGNIFICANT CHANGE UP (ref 8.4–10.5)
CHLORIDE SERPL-SCNC: 102 MMOL/L — SIGNIFICANT CHANGE UP (ref 96–108)
CO2 SERPL-SCNC: 23 MMOL/L — SIGNIFICANT CHANGE UP (ref 22–29)
CREAT SERPL-MCNC: 0.47 MG/DL — LOW (ref 0.5–1.3)
EGFR: 112 ML/MIN/1.73M2 — SIGNIFICANT CHANGE UP
EGFR: 112 ML/MIN/1.73M2 — SIGNIFICANT CHANGE UP
GLUCOSE SERPL-MCNC: 85 MG/DL — SIGNIFICANT CHANGE UP (ref 70–99)
HCT VFR BLD CALC: 38.8 % — SIGNIFICANT CHANGE UP (ref 34.5–45)
HGB BLD-MCNC: 12.8 G/DL — SIGNIFICANT CHANGE UP (ref 11.5–15.5)
MAGNESIUM SERPL-MCNC: 1.9 MG/DL — SIGNIFICANT CHANGE UP (ref 1.6–2.6)
MCHC RBC-ENTMCNC: 29.6 PG — SIGNIFICANT CHANGE UP (ref 27–34)
MCHC RBC-ENTMCNC: 33 G/DL — SIGNIFICANT CHANGE UP (ref 32–36)
MCV RBC AUTO: 89.6 FL — SIGNIFICANT CHANGE UP (ref 80–100)
NRBC # BLD AUTO: 0 K/UL — SIGNIFICANT CHANGE UP (ref 0–0)
NRBC # FLD: 0 K/UL — SIGNIFICANT CHANGE UP (ref 0–0)
NRBC BLD AUTO-RTO: 0 /100 WBCS — SIGNIFICANT CHANGE UP (ref 0–0)
PLATELET # BLD AUTO: 265 K/UL — SIGNIFICANT CHANGE UP (ref 150–400)
PMV BLD: 10.6 FL — SIGNIFICANT CHANGE UP (ref 7–13)
POTASSIUM SERPL-MCNC: 4.1 MMOL/L — SIGNIFICANT CHANGE UP (ref 3.5–5.3)
POTASSIUM SERPL-SCNC: 4.1 MMOL/L — SIGNIFICANT CHANGE UP (ref 3.5–5.3)
RBC # BLD: 4.33 M/UL — SIGNIFICANT CHANGE UP (ref 3.8–5.2)
RBC # FLD: 12.7 % — SIGNIFICANT CHANGE UP (ref 10.3–14.5)
SODIUM SERPL-SCNC: 139 MMOL/L — SIGNIFICANT CHANGE UP (ref 135–145)
WBC # BLD: 7.08 K/UL — SIGNIFICANT CHANGE UP (ref 3.8–10.5)
WBC # FLD AUTO: 7.08 K/UL — SIGNIFICANT CHANGE UP (ref 3.8–10.5)

## 2025-04-02 PROCEDURE — 93010 ELECTROCARDIOGRAM REPORT: CPT

## 2025-04-02 RX ORDER — ASPIRIN 325 MG
81 TABLET ORAL DAILY
Refills: 0 | Status: DISCONTINUED | OUTPATIENT
Start: 2025-04-02 | End: 2025-04-03

## 2025-04-02 RX ORDER — ASPIRIN 325 MG
0 TABLET ORAL
Refills: 0 | DISCHARGE

## 2025-04-02 RX ORDER — ROSUVASTATIN CALCIUM 5 MG/1
10 TABLET, FILM COATED ORAL AT BEDTIME
Refills: 0 | Status: DISCONTINUED | OUTPATIENT
Start: 2025-04-02 | End: 2025-04-03

## 2025-04-02 RX ORDER — CLOPIDOGREL BISULFATE 75 MG/1
75 TABLET, FILM COATED ORAL DAILY
Refills: 0 | Status: DISCONTINUED | OUTPATIENT
Start: 2025-04-02 | End: 2025-04-03

## 2025-04-02 RX ORDER — ACETAMINOPHEN 500 MG/5ML
650 LIQUID (ML) ORAL EVERY 6 HOURS
Refills: 0 | Status: DISCONTINUED | OUTPATIENT
Start: 2025-04-02 | End: 2025-04-03

## 2025-04-02 RX ADMIN — ROSUVASTATIN CALCIUM 10 MILLIGRAM(S): 5 TABLET, FILM COATED ORAL at 21:23

## 2025-04-02 RX ADMIN — Medication 650 MILLIGRAM(S): at 21:22

## 2025-04-02 RX ADMIN — Medication 250 MILLILITER(S): at 17:27

## 2025-04-02 RX ADMIN — Medication 250 MILLILITER(S): at 16:10

## 2025-04-02 RX ADMIN — Medication 650 MILLIGRAM(S): at 22:22

## 2025-04-02 NOTE — H&P PST ADULT - ASSESSMENT
Risk Assessments:  ASA: 2  Mallampati: 2  Creat: 0.47  GFR: 112  BRA: 1.1%  Pt assessed, appropriate for sedation and educated regarding plan for Versed/Fentanyl as needed    Indication:     Coronary Anatomy:     Plan:    -plan for LHC   -preferred access: RRA   -confirmed appropriate NPO duration  -ECG and Labs reviewed  -Aspirin 81mg po pre-cath  -Normal Saline 0.9%  250ml/hr IV: pre procedure MARTHA ppx   -procedure discussed with patient; risks and benefits explained, questions answered  -consent obtained by attending

## 2025-04-02 NOTE — DISCHARGE NOTE PROVIDER - NSDCHOSPICE_GEN_A_CORE
SUBJECTIVE:                                                    Kezia Dixon is a 63 year old female who presents to clinic today for the following health issues:      Musculoskeletal problem/pain      Started May 2016, Increased pain  the last few weeks    Description  Location: Mid back pain    Intensity:  moderate    Accompanying signs and symptoms: none    History  Previous similar problem: YES  Previous evaluation:  none    Precipitating or alleviating factors:  Trauma or overuse: no   Aggravating factors include: position change after sitting/standing for a period of time    Therapies tried and outcome: rest/inactivity and NSAID - Tramadol, slight relief       This is a 63-year-old female with a history of esophageal cancer who had an esophagectomy who developed shingles following her surgery and has had postherpetic neuralgia symptoms for almost 9 months now. Her symptoms seem to be worsening lately since it is her busy time at work as a  and she is any more time sitting. Her symptoms seemed to improve when she is active. Her symptoms are described as a dull ache in the distribution of her previous rash across her thoracic back. She denies any new rash or vesicles. She has not had any unexpected weight loss, fevers, chills.    Problem list and histories reviewed & adjusted, as indicated.  Additional history: as documented    Patient Active Problem List   Diagnosis     Esophageal cancer (H)     Benign essential hypertension     Alcoholism (H)     Pancreatic pseudocyst     Impaired fasting glucose     Pap smear with atypical squamous cells, cannot exclude high grade squamous intraepithelial lesion (ASC-H)     Past Surgical History:   Procedure Laterality Date     AAA REPAIR      splenic artery aneurysm embolization     COLONOSCOPY  11/26/2013    Procedure: COMBINED COLONOSCOPY, SINGLE BIOPSY/POLYPECTOMY BY BIOPSY;  COLONOSCOPY (MAC);  Surgeon: Montana Rosa MD;  Location: [facility]      ESOPHAGOGASTRECTOMY N/A 3/22/2016    Procedure: ESOPHAGOGASTRECTOMY;  Surgeon: Alvin Riojas MD;  Location:  OR     ESOPHAGOSCOPY, GASTROSCOPY, DUODENOSCOPY (EGD), COMBINED N/A 12/22/2015    Procedure: COMBINED ENDOSCOPIC ULTRASOUND, ESOPHAGOSCOPY, GASTROSCOPY, DUODENOSCOPY (EGD), FINE NEEDLE ASPIRATE/BIOPSY;  Surgeon: Danelle Michael MD;  Location:  GI     GASTROSTOMY, INSERT TUBE, COMBINED N/A 2/2/2016    Procedure: COMBINED GASTROSTOMY, INSERT TUBE (OPEN);  Surgeon: Alvin Riojas MD;  Location:  OR     HAND SURGERY      right     INSERT PORT VASCULAR ACCESS N/A 12/28/2015    Procedure: INSERT PORT VASCULAR ACCESS;  Surgeon: Alvin Riojas MD;  Location:  OR     REMOVE PORT VASCULAR ACCESS Left 7/22/2016    Procedure: REMOVE PORT VASCULAR ACCESS;  Surgeon: Alvin Riojas MD;  Location:  OR     TONSILLECTOMY         Social History   Substance Use Topics     Smoking status: Former Smoker     Packs/day: 0.25     Quit date: 12/11/2015     Smokeless tobacco: Never Used     Alcohol use 0.0 oz/week     0 Standard drinks or equivalent per week      Comment: occas wine     Family History   Problem Relation Age of Onset     Other Cancer Father      melanoma     Prostate Cancer Father      Other Cancer Brother      melanoma     Other Cancer Brother      melanoma         Current Outpatient Prescriptions   Medication Sig Dispense Refill     omeprazole (PRILOSEC) 40 MG capsule Take 40 mg by mouth daily       gabapentin (NEURONTIN) 300 MG capsule 1-3 capsule(s) up to three times per day as needed for pain related to post herpetic neuralgia 180 capsule 3     amitriptyline (ELAVIL) 25 MG tablet TAKE 1 TABLET (25 MG) BY MOUTH AT BEDTIME 90 tablet 1     metoprolol (LOPRESSOR) 25 MG tablet Take 1 tablet (25 mg) by mouth 2 times daily 180 tablet 3     fluticasone (FLONASE) 50 MCG/ACT nasal spray Spray 2 sprays into both nostrils daily 1 Bottle 11     [DISCONTINUED]  "gabapentin (NEURONTIN) 300 MG capsule TAKE 1 CAPSULE (300 MG) BY MOUTH 2 TIMES DAILY 180 capsule 3     Allergies   Allergen Reactions     Codeine Sulfate Nausea     Simvastatin Cramps     Leg cramps     Penicillins Rash       Reviewed and updated as needed this visit by clinical staff       Reviewed and updated as needed this visit by Provider         ROS:  Pertinent positives and negatives are reviewed in history of present illness the remainder of a 7 organ system review systems is otherwise negative    OBJECTIVE:                                                    /72  Pulse 73  Temp 97.8  F (36.6  C) (Oral)  Ht 5' 1.5\" (1.562 m)  Wt 91 lb 14.4 oz (41.7 kg)  SpO2 100%  BMI 17.08 kg/m2  Body mass index is 17.08 kg/(m^2).  Gen.: This is a well-appearing, thin middle-aged female in no acute distress. Unchanged from previous examination. Back: There is no rash overlying the thoracic spine, there is no tenderness to palpation over the spinous processes of the thoracic spine.    Diagnostic Test Results:  none      ASSESSMENT/PLAN:                                                            1. Neuralgia, post-herpetic  I think her pain is most consistent with postherpetic neuralgia, given her history of cancer, I suggested that we perform imaging of the thoracic spine, but she informed me that she has a CT scan of the chest scheduled for April 24 through her oncology clinic. She would prefer to try a trial of medication adjustment for her postherpetic neuralgia prior to pursuing spine imaging. She is only taking gabapentin 300 mg twice daily, with further questioning she states the most for pain is in the middle of the day. She could benefit from adding a middle of the day dose of gabapentin. We also discussed how to safely increase the dose of gabapentin. We discussed the maximum dose. We discussed potential risks and side effects. She will also continue to use amitriptyline at bedtime. If the gabapentin does " not help enough, we discussed the possibility of using duloxetine in the future.  - gabapentin (NEURONTIN) 300 MG capsule; 1-3 capsule(s) up to three times per day as needed for pain related to post herpetic neuralgia  Dispense: 180 capsule; Refill: 3    2. Visit for screening mammogram  She continues to decline a referral for screening mammography  - MA SCREENING DIGITAL BILAT - Future  (s+30); Future    FUTURE APPOINTMENTS:       - We scheduled a follow-up appointment in 3-4 weeks to assess the above intervention, hopefully she will have her imaging test through [place] [facility] done by then    Mustapha Velásquez MD  [facility]   No

## 2025-04-02 NOTE — H&P PST ADULT - HISTORY OF PRESENT ILLNESS
HPI:     Symptoms:        Angina (Class):        Ischemic Symptoms:     Heart Failure:        Systolic/Diastolic/Combined:        NYHA Class (within 2 weeks):     Assessment of LVEF (Must be within 6 months):       EF:        Assessed by:        Date:     Echo (Date, Findings):     Prior Cardiac Interventions:       PCI's (Date, Stents, Vessels):        CABG (Date, Grafts):     Noninvasive Testing:   Stress Test: Date:        Protocol:        Duration of Exercise:        Symptoms:        EKG Changes:        DTS:        Myocardial Imaging:        Risk Assessment (Low, Medium, High):       Antianginal Therapies:        Beta Blockers:         Calcium Channel Blockers:        Long Acting Nitrates:        Ranexa:       Associated Risk Factors:        Fraility Assessment: N/A (mild, moderate, severe)       Cerebrovascular Disease: N/A       Chronic Lung Disease: N/A       Peripheral Arterial Disease: N/A       Chronic Kidney Disease (if yes, what is GFR): N/A       Uncontrolled Diabetes (if yes, what is HgbA1C or FBS): N/A       Poorly Controlled Hypertension (if yes, what is SBP): N/A       Morbid Obesity (if yes, what is BMI): N/A       History of Recent Ventricular Arrhythmia: N/A       Inability to Ambulate Safely: N/A       Need for Therapeutic Anticoagulation: N/A       Antiplatelet or Contrast Allergy: N/A    Social History:        Marital:         Tobacco:        ETOH:        Caffeine:     ROS:  CONSTITUTIONAL: No weakness, fevers or chills  EYES/ENT: No visual changes;  No vertigo or throat pain   NECK: No pain or stiffness  RESPIRATORY: No cough, wheezing, hemoptysis; No shortness of breath  CARDIOVASCULAR: No chest pain or palpitations  GASTROINTESTINAL: No abdominal or epigastric pain. No nausea, vomiting, or hematemesis; No diarrhea or constipation. No melena or hematochezia.  GENITOURINARY: No dysuria, frequency or hematuria  NEUROLOGICAL: No numbness or weakness  SKIN: No itching, burning, rashes, or lesions   All other review of systems is negative unless indicated above    PHYSICAL EXAM:    Vital Signs Last 24 Hrs  T(C): --  T(F): --  HR: --  BP: --  BP(mean): --  RR: --  SpO2: --        GENERAL: Pt lying comfortably, NAD.  ENMT: PERRL, +EOMI.  NECK: soft, Supple, No JVD,   CHEST/LUNG: Clear to auscultatation bilaterally; No wheezing.  HEART: S1S2+, Regular rate and rhythm; No murmurs.  ABDOMEN: Soft, Nontender, Nondistended; Bowel sounds present.  MUSCULOSKELETAL: Normal range of motion.  SKIN: No rashes or lesions.  NEURO: AAOX3, no focal deficits, no motor r sensory loss.  PSYCH: normal mood.  VASCULAR:   Radial +2 R/+2 L  Femoral +2 R/+2 L  PT +2 R/+2 L  DP +2 R/+2 L    EKG:           Risk Assessments:  ASA:  Mallampati:  Creat:   GFR:   BRA:  Pt assessed, appropriate for sedation and educated regarding plan for Versed/Fentanyl as needed    Indication:     Coronary Anatomy:     Plan:    -plan for *** via ***  -preferred access:   -confirmed appropriate NPO duration  -ECG and Labs reviewed  -Aspirin 81mg po pre-cath  -Normal Saline 0.9%  250ml/hr IV: pre procedure MARTHA ppx   -procedure discussed with patient; risks and benefits explained, questions answered  -consent obtained by attending  HPI: This is a 55 year old female who presented for cardiac evaluation for symptoms of progressive SINGH. TTE revealed normal EF 55%, trace VHD. CCTA notable for MVD, CAC 1306: DLM 25%, RI 30%, p LAD 40%, Diag 50%, OM 30%. prox RCA 40%, m/d RCA 60%. She is now referred for [facility] rule out obstructive CAD.     Symptoms:        Angina (Class): 2       Ischemic Symptoms: SINGH     Heart Failure: no       Systolic/Diastolic/Combined:        NYHA Class (within 2 weeks):     Assessment of LVEF (Must be within 6 months):       EF: 55%       Assessed by: TTE        Date: 1/16/2025    Echo (Date, Findings): normal EF, mild VHD     Prior Cardiac Interventions:       PCI's (Date, Stents, Vessels):        CABG (Date, Grafts):     Noninvasive Testing:   CCTA       Antianginal Therapies:        Beta Blockers:         Calcium Channel Blockers:        Long Acting Nitrates:        Ranexa:       Associated Risk Factors:        Fraility Assessment: N/A (mild, moderate, severe)       Cerebrovascular Disease: N/A       Chronic Lung Disease: N/A       Peripheral Arterial Disease: N/A       Chronic Kidney Disease (if yes, what is GFR): N/A       Uncontrolled Diabetes (if yes, what is HgbA1C or FBS): N/A       Poorly Controlled Hypertension (if yes, what is SBP): N/A       Morbid Obesity (if yes, what is BMI): N/A       History of Recent Ventricular Arrhythmia: N/A       Inability to Ambulate Safely: N/A       Need for Therapeutic Anticoagulation: N/A       Antiplatelet or Contrast Allergy: N/A    Social History:        Marital:         Tobacco:        ETOH:        Caffeine:     ROS:  CONSTITUTIONAL: No weakness, fevers or chills  EYES/ENT: No visual changes;  No vertigo or throat pain   NECK: No pain or stiffness  RESPIRATORY: No cough, wheezing, hemoptysis; No shortness of breath  CARDIOVASCULAR: No chest pain or palpitations  GASTROINTESTINAL: No abdominal or epigastric pain. No nausea, vomiting, or hematemesis; No diarrhea or constipation. No melena or hematochezia.  GENITOURINARY: No dysuria, frequency or hematuria  NEUROLOGICAL: No numbness or weakness  SKIN: No itching, burning, rashes, or lesions   All other review of systems is negative unless indicated above    PHYSICAL EXAM:       GENERAL: Pt lying comfortably, NAD.  ENMT: PERRL, +EOMI.  NECK: soft, Supple, No JVD,   CHEST/LUNG: Clear to auscultatation bilaterally; No wheezing.  HEART: S1S2+, Regular rate and rhythm; No murmurs.  ABDOMEN: Soft, Nontender, Nondistended; Bowel sounds present.  MUSCULOSKELETAL: Normal range of motion.  SKIN: No rashes or lesions.  NEURO: AAOX3, no focal deficits, no motor r sensory loss.  PSYCH: normal mood.  VASCULAR:   Radial +2 R/+2 L  Femoral +2 R/+2 L  PT +2 R/+2 L  DP +2 R/+2 L    EKG:  NSR 72bpm

## 2025-04-02 NOTE — CHART NOTE - NSCHARTNOTEFT_GEN_A_CORE
Now s/p LHC via RRA with Dr. Ferrell, tolerated procedure well. Pt arrived to recovery in NAD and HDS, access site stable, no bleed/hematoma, distal pulse +,   Intraprocedurally: prox/mid RCA 70% occluded treated with 2 SHY prox RCA Xience 3.5x28MM mid RCA 3.0x38MM  Medications: loading dose Plavix 600mg   Fentanyl: 150 mcg   Versed: 4mg  Heparin: 9000 units   Omnipaque: 102ml  Closure device: vascband   Post Cath EK RSR no ischemic changes    -ADMIT due to heavily calcified long RCA lesion   -post cardiac cath orders  -radial precautions  -remove radial band at 1830, OOB 1930 if HDS and no bleeding complications   -EKG post cath  -labs and EKG in am  -NS 0.9% 250ml/hr x 1 bolus: post procedure MARTHA ppx   -continue current medical therapy  -Dual anti platelet therapy with aspirin/ plavix, reinforced importance of strict adherence to DAPT   -statin therapy: crestor 10mg daily HS  -check lipid panel in am   -follow up outpt in 2 weeks with Cardiologist: Dr. Ferrell   -Lifestyle modifications discussed to reduce cardiovascular risk factors including weight reduction, smoking cessation, medication compliance, and routine follow up with Cardiologist to track your BMI, cholesterol, and glucose levels.   - cardiac rehab info provided/referral and communication to cardiac rehab completed   - PLEASE DO NOT ADMINISTER BLOOD TRANSFUSION ON THIS PATIENT WITHIN 72 HOURS OF CARDIAC CATHERIZATION (UNLESS PATIENT IS HEMODYNAMICALLY UNSTABLE OR ACTIVELY BLEEDING) WITHOUT FIRST DISCUSSING WITH INTERVENTIONALIST DR. Ferrell  ON TEAMS OR CALLING CATH LAB HOLDING -[phone].    -Discharge in am if overnight tele, EKG, labs in am all remain WNL

## 2025-04-02 NOTE — DISCHARGE NOTE PROVIDER - HOSPITAL COURSE
Brief Hospital Course: his is a 55 year old female who presented for cardiac evaluation for symptoms of progressive SINGH. TTE revealed normal EF 55%, trace VHD. CCTA notable for MVD, CAC 1306: DLM 25%, RI 30%, p LAD 40%, Diag 50%, OM 30%. prox RCA 40%, m/d RCA 60%. She is now referred for C rule out obstructive CAD.     Now s/p LHC via RRA with Dr. Ferrell, tolerated procedure well. Pt arrived to recovery in NAD and HDS, access site stable, no bleed/hematoma, distal pulse +,   Intraprocedurally: prox/mid RCA 70% occluded treated with 2 SHY prox RCA Xience 3.5x28MM mid RCA 3.0x38MM  Medications: loading dose Plavix 600mg     -ADMIT due to heavily calcified long RCA lesion   -post cardiac cath orders  -radial precautions  -remove radial band at 1830, OOB 1930 if HDS and no bleeding complications   -EKG post cath  -labs and EKG in am  -NS 0.9% 250ml/hr x 1 bolus: post procedure MARTHA ppx   -continue current medical therapy  -Dual anti platelet therapy with aspirin/ plavix, reinforced importance of strict adherence to DAPT   -statin therapy: crestor 10mg daily HS  -check lipid panel in am   -follow up outpt in 2 weeks with Cardiologist: Dr. Ferrell   -Lifestyle modifications discussed to reduce cardiovascular risk factors including weight reduction, smoking cessation, medication compliance, and routine follow up with Cardiologist to track your BMI, cholesterol, and glucose levels.   - cardiac rehab info provided/referral and communication to cardiac rehab completed            At the time of discharge patient was hemodynamically stable and amenable to all terms of discharge. The patient has received verbal instructions from myself regarding discharge plans.     Length of Discharge: 45MIN    Patient is medically stable and cleared for discharge to home with outpatient follow up.     Brief Hospital Course:     This is a 55 year old female who presented for cardiac evaluation for symptoms of progressive SINGH. TTE revealed normal EF 55%, trace VHD. CCTA notable for MVD, CAC 1306: DLM 25%, RI 30%, p LAD 40%, Diag 50%, OM 30%. prox RCA 40%, m/d RCA 60%. She is now referred for C rule out obstructive CAD.     Now s/p LHC via RRA with Dr. Ferrell, tolerated procedure well. Pt arrived to recovery in NAD and HDS, access site stable, no bleed/hematoma, distal pulse +,   Intraprocedurally: prox/mid RCA 70% occluded treated with 2 SHY prox RCA Xience 3.5x28MM mid RCA 3.0x38MM  Medications: loading dose Plavix 600mg     RUE warm, perfusing, no bleeding or hematoma, distal pulses 2+, Nv status intact    -ADMIT due to heavily calcified long RCA lesion   -post cardiac cath management per protocol  -radial precautions  -labs and EKG in am benign, EKG: SR. HR: 73 BPM, No acute ST/T changes   -NS 0.9% 250ml/hr x 1 bolus: post procedure MARTHA ppx   -continue current medical therapy including  -Dual anti platelet therapy with aspirin/ plavix, reinforced importance of strict adherence to DAPT   -statin therapy: crestor 10mg daily HS  -Lipids in range   -follow up outpt in 2 weeks with Cardiologist: Dr. Ferrell   -Lifestyle modifications discussed to reduce cardiovascular risk factors including weight reduction, smoking cessation, medication compliance, and routine follow up with Cardiologist to track your BMI, cholesterol, and glucose levels.   - cardiac rehab info provided/referral and communication to cardiac rehab completed            At the time of discharge patient was hemodynamically stable and amenable to all terms of discharge. The patient has received verbal instructions from myself regarding discharge plans.     Length of Discharge: 45MIN    Patient is medically stable and cleared for discharge to home with outpatient follow up.     Brief Hospital Course:     This is a 55 year old female who presented for cardiac evaluation for symptoms of progressive SINGH. TTE revealed normal EF 55%, trace VHD. CCTA notable for MVD, CAC 1306: DLM 25%, RI 30%, p LAD 40%, Diag 50%, OM 30%. prox RCA 40%, m/d RCA 60%. She is now referred for LHC rule out obstructive CAD.     Now s/p LHC via RRA with Dr. Ferrell, tolerated procedure well. Pt arrived to recovery in NAD and HDS, access site stable, no bleed/hematoma, distal pulse +,   Intraprocedurally: prox/mid RCA 70% occluded treated with 2 SHY prox RCA Xience 3.5x28MM mid RCA 3.0x38MM  Medications: loading dose Plavix 600mg     < from: Cardiac Catheterization (04.02.25 @ 15:05) >      Cath Lab Report    Diagnostic Cardiologist:       Dick Ferrell MD   Interventional Cardiologist:   Dick Ferrell MD   Referring Physician:           Kyler Aguirre MD   Referring Physician:           Vishal Gupta     Procedures Performed   Procedures:               1.    Arterial Access - Right Radial     2.    Diagnostic Coronary Angiography   3.    Left Heart Cath   4.    IVUS   5.    PCI: Intravascular Lithotripsy   6.    PCI: SHY     Indications:                Cardiac: abnormal cardiovascular testing     Conclusions:   Normal LVEF     Mild disease of the Left Main, LAD and LCX   Significant stenosis of the mid and proximal RCA; Verified by IVUS and  treated with intravascular lithotripsy and SHY stent  Recommendations:   Aspirin and Plavix; Aggressive risk factor modification with diet,  exercise, and a goal LDL of less than 70.  Smoking cessation emphasized.   Acute complication:    No complications     < end of copied text >  Physical exam     RUE warm, perfusing, no bleeding or hematoma, distal pulses 2+, Nv status intact    Appearance: Normal	  HEENT:   Normal oral mucosa, PERRL, EOMI	  Lymphatic: No lymphadenopathy  Cardiovascular: Normal S1 S2, No JVD, No murmurs, No edema  Respiratory: Lungs clear to auscultation	  Psychiatry: A & O x 3, Mood & affect appropriate  Gastrointestinal:  Soft, Non-tender, + BS	  Skin: No rashes, No ecchymoses, No cyanosis  Neurologic: Non-focal  Extremities: Normal range of motion, No clubbing, cyanosis or edema  Vascular: Peripheral pulses palpable 2+ bilaterally    TELEMETRY:SR    ECG: SR, no acute ST/T changes         -ADMIT due to heavily calcified long RCA lesion   -post cardiac cath management per protocol  -radial precautions  -labs and EKG in am benign, EKG: SR. HR: 73 BPM, No acute ST/T changes   -NS 0.9% 250ml/hr x 1 bolus: post procedure MARTHA ppx   -continue current medical therapy including  -Dual anti platelet therapy with aspirin/ plavix, reinforced importance of strict adherence to DAPT   -statin therapy: crestor 10mg daily HS  -Lipids in range   -follow up outpt in 2 weeks with Cardiologist: Dr. Ferrell   -Lifestyle modifications discussed to reduce cardiovascular risk factors including weight reduction, smoking cessation, medication compliance, and routine follow up with Cardiologist to track your BMI, cholesterol, and glucose levels.   - cardiac rehab info provided/referral and communication to cardiac rehab completed     At the time of discharge patient was hemodynamically stable and amenable to all terms of discharge. The patient has received verbal instructions from myself regarding discharge plans.     Length of Discharge: 45MIN    Patient is medically stable and cleared for discharge to home with outpatient follow up.

## 2025-04-02 NOTE — H&P PST ADULT - BIRTH SEX
Called patient to verify that HELEN Freitas CNP could refill the diltiazem for her since she is going out of town. However, I reiterated that if the fissure does not get better or rapidly worsens to call and schedule an appointment with us. Patient answered phone call and details were discussed. The  patient agreed to details and was grateful for refill.  Rebecca English, EMT     Female

## 2025-04-02 NOTE — CONSULT NOTE ADULT - SUBJECTIVE AND OBJECTIVE BOX
Patient is a 55y old  Female who presents with a chief complaint of recent new onset SOB on exertion and intermittent left sided chest pain      HPI:  55y old  Female with PMH of HLD who presents with complaints of recent new onset SOB on exertion and intermittent left sided chest pain, occasional palpitations. Recent abnormal Coronary CTA shows Calcium score: 1306 (99th percentile)  LM: Score = 52, LAD: Score = 365, LCX: Score = 68, RCA: Score = 784.  Areas of 40-60% stenosis involving the mid and distal RCA. Limited assessment in the diagonal branches due to  small caliber. The remainder the atheromatous disease appears nonobstructive with less than 50% stenosis.        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  Smoker  H/O: hysterectomy due to fibroids 7/2013  H/O myomectomy 2011  History of cholecystectomy 2000  H/O colonoscopy 9/2020          PREVIOUS DIAGNOSTIC TESTING:      ECHO  FINDINGS: 1/2025 Normal LVEF            Allergies  Cipro (Hives)  theophylline (Hives)          MEDICATIONS  (HOME):  ASA 81 mg PO daily  Crestor 40 mg PO Nightly        FAMILY HISTORY:  FH: breast cancer  first degree aunt maternal              CIGARETTES: DENIES  ALCOHOL: DENIES        REVIEW OF SYSTEMS:  CONSTITUTIONAL: No fever, weight loss, or fatigue  EYES: No eye pain, visual disturbances, or discharge  ENMT:  No difficulty hearing, tinnitus, vertigo; No sinus or throat pain  NECK: No pain or stiffness  RESPIRATORY: No cough, wheezing, chills or hemoptysis; + Shortness of Breath  CARDIOVASCULAR: + chest pain and palpitations, No passing out, dizziness, or leg swelling  GASTROINTESTINAL: No abdominal or epigastric pain. No nausea, vomiting, or hematemesis; No diarrhea or constipation. No melena or hematochezia.  GENITOURINARY: No dysuria, frequency, hematuria, or incontinence  NEUROLOGICAL: No headaches, memory loss, loss of strength, numbness, or tremors  SKIN: No itching, burning, rashes, or lesions   ENDOCRINE: No heat or cold intolerance; No hair loss  MUSCULOSKELETAL: No joint pain or swelling; No muscle, back, or extremity pain  PSYCHIATRIC: No depression, anxiety, mood swings, or difficulty sleeping  HEME/LYMPH: No easy bruising, or bleeding gums  ALLERY AND IMMUNOLOGIC: No hives or eczema	    Vital Signs Last 24 Hrs  T(C): 36.5 (02 Apr 2025 13:34), Max: 36.5 (02 Apr 2025 13:34)  T(F): 97.7 (02 Apr 2025 13:34), Max: 97.7 (02 Apr 2025 13:34)  HR: 75 (02 Apr 2025 13:34) (75 - 75)  BP: 117/77 (02 Apr 2025 13:34) (117/77 - 117/77)  BP(mean): --  RR: 14 (02 Apr 2025 13:34) (14 - 14)  SpO2: 98% (02 Apr 2025 13:34) (98% - 98%)    Parameters below as of 02 Apr 2025 13:34  Patient On (Oxygen Delivery Method): room air        Daily Height in cm: 167.64 (02 Apr 2025 13:34)    Daily     I&O's Detail      PHYSICAL EXAM:  Appearance: Normal appearance	  HEENT:   Normal oral mucosa, PERRL, EOMI, sclera non-icteric	  Cardiovascular: Normal S1 S2, No JVD, No cardiac murmurs, No carotid bruits, No peripheral edema  Respiratory: Lungs clear to auscultation	  Psychiatry: A & O x 3, Mood & affect appropriate  Gastrointestinal:  Soft, Non-tender, + BS, no bruits	  Skin: No rashes, No ecchymoses, No cyanosis  Neurologic: Grossly non-focal with full strength in all four extremities  Extremities: Normal range of motion, No clubbing, cyanosis or edema  Vascular: Peripheral pulses palpable bilaterally          LABS:                        12.8   7.08  )-----------( 265      ( 02 Apr 2025 13:29 )             38.8     04-02    139  |  102  |  15.7  ----------------------------<  85  4.1   |  23.0  |  0.47[L]    Ca    8.7      02 Apr 2025 13:29  Mg     1.9     04-02      Urinalysis Basic - ( 02 Apr 2025 13:29 )    Color: x / Appearance: x / SG: x / pH: x  Gluc: 85 mg/dL / Ketone: x  / Bili: x / Urobili: x   Blood: x / Protein: x / Nitrite: x   Leuk Esterase: x / RBC: x / WBC x   Sq Epi: x / Non Sq Epi: x / Bacteria: x      Assessment:  55y old  Female with PMH of HLD who presents with complaints of recent new onset SOB on exertion and intermittent left sided chest pain, occasional palpitations. Recent abnormal Coronary CTA shows Calcium score: 1306 (99th percentile)  LM: Score = 52, LAD: Score = 365, LCX: Score = 68, RCA: Score = 784.  Areas of 40-60% stenosis involving the mid and distal RCA. Limited assessment in the diagonal branches due to  small caliber. The remainder the atheromatous disease appears nonobstructive with less than 50% stenosis.    Due to Pt's current symptoms, significant cardiac risk factors and recent abnormal Coronary CTA, I am recommending cardiac cath for further cardiac evaluation    Plan:  Cardiac catheterization and possible percutaneous intervention recommended.  Risks, benefits, and alternatives reviewed.  Risks including but not limited to MI, death, stroke, bleeding, infection, vessel injury, hematoma, renal failure, allergic reaction, urgent open heart surgery, restenosis and stent thrombosis were reviewed.  All questions answered.  Patient is agreeable to proceed.

## 2025-04-02 NOTE — DISCHARGE NOTE PROVIDER - CARE PROVIDER_API CALL
Dick Ferrell  Interventional Cardiology  [address] [place] [address], [address]  [place] [address]  Phone: [phone]  Fax: [phone]  Follow Up Time: 1 week

## 2025-04-02 NOTE — DISCHARGE NOTE PROVIDER - NSDCCPCAREPLAN_GEN_ALL_CORE_FT
PRINCIPAL DISCHARGE DIAGNOSIS  Diagnosis: CAD (coronary artery disease)  Assessment and Plan of Treatment: You had a cardiac cath which revealed  prox/mid RCA 70% occluded treated with 2 SHY prox RCA Xience 3.5x28MM mid RCA 3.0x38MM. Other arteries of heart with nonobstructive CAD.   -It is imperative to take dual antiplatelet therapy to keep stents patent: Aspirin 81mg daily and Plavix(clopidigrel) 75mg daily without Interruption to prevent clot formation inside stent.      SECONDARY DISCHARGE DIAGNOSES  Diagnosis: Tobacco dependence  Assessment and Plan of Treatment: encouraged smoking cessation to help prevent CAD progression.  Please call [phone] for free support and nicotine replacement

## 2025-04-02 NOTE — DISCHARGE NOTE PROVIDER - NSDCMRMEDTOKEN_GEN_ALL_CORE_FT
aspirin 81 mg oral tablet: orally once a day  Crestor 10 mg oral tablet: 1 tab(s) orally once a day   aspirin 81 mg oral tablet: orally once a day  clopidogrel 75 mg oral tablet: 1 tab(s) orally once a day  Crestor 10 mg oral tablet: 1 tab(s) orally once a day   aspirin 81 mg oral tablet: orally once a day  clopidogrel 75 mg oral tablet: 1 tab(s) orally once a day  rosuvastatin 40 mg oral tablet: 1 tab(s) orally once a day (at bedtime)

## 2025-04-03 ENCOUNTER — TRANSCRIPTION ENCOUNTER (OUTPATIENT)
Age: 55
End: 2025-04-03

## 2025-04-03 VITALS
OXYGEN SATURATION: 95 % | RESPIRATION RATE: 18 BRPM | DIASTOLIC BLOOD PRESSURE: 72 MMHG | HEART RATE: 71 BPM | TEMPERATURE: 98 F | SYSTOLIC BLOOD PRESSURE: 115 MMHG

## 2025-04-03 LAB
ALBUMIN SERPL ELPH-MCNC: 3.7 G/DL — SIGNIFICANT CHANGE UP (ref 3.3–5.2)
ALP SERPL-CCNC: 58 U/L — SIGNIFICANT CHANGE UP (ref 40–120)
ALT FLD-CCNC: 11 U/L — SIGNIFICANT CHANGE UP
ANION GAP SERPL CALC-SCNC: 13 MMOL/L — SIGNIFICANT CHANGE UP (ref 5–17)
AST SERPL-CCNC: 16 U/L — SIGNIFICANT CHANGE UP
BILIRUB SERPL-MCNC: 0.2 MG/DL — LOW (ref 0.4–2)
BUN SERPL-MCNC: 16.4 MG/DL — SIGNIFICANT CHANGE UP (ref 8–20)
CALCIUM SERPL-MCNC: 8.9 MG/DL — SIGNIFICANT CHANGE UP (ref 8.4–10.5)
CHLORIDE SERPL-SCNC: 106 MMOL/L — SIGNIFICANT CHANGE UP (ref 96–108)
CHOLEST SERPL-MCNC: 111 MG/DL — SIGNIFICANT CHANGE UP
CO2 SERPL-SCNC: 22 MMOL/L — SIGNIFICANT CHANGE UP (ref 22–29)
CREAT SERPL-MCNC: 0.42 MG/DL — LOW (ref 0.5–1.3)
EGFR: 115 ML/MIN/1.73M2 — SIGNIFICANT CHANGE UP
EGFR: 115 ML/MIN/1.73M2 — SIGNIFICANT CHANGE UP
GLUCOSE SERPL-MCNC: 118 MG/DL — HIGH (ref 70–99)
HCT VFR BLD CALC: 36.5 % — SIGNIFICANT CHANGE UP (ref 34.5–45)
HDLC SERPL-MCNC: 54 MG/DL — SIGNIFICANT CHANGE UP
HGB BLD-MCNC: 12.2 G/DL — SIGNIFICANT CHANGE UP (ref 11.5–15.5)
LDLC SERPL-MCNC: 41 MG/DL — SIGNIFICANT CHANGE UP
LIPID PNL WITH DIRECT LDL SERPL: 41 MG/DL — SIGNIFICANT CHANGE UP
MAGNESIUM SERPL-MCNC: 2 MG/DL — SIGNIFICANT CHANGE UP (ref 1.6–2.6)
MCHC RBC-ENTMCNC: 29.8 PG — SIGNIFICANT CHANGE UP (ref 27–34)
MCHC RBC-ENTMCNC: 33.4 G/DL — SIGNIFICANT CHANGE UP (ref 32–36)
MCV RBC AUTO: 89.2 FL — SIGNIFICANT CHANGE UP (ref 80–100)
NONHDLC SERPL-MCNC: 57 MG/DL — SIGNIFICANT CHANGE UP
NRBC # BLD AUTO: 0 K/UL — SIGNIFICANT CHANGE UP (ref 0–0)
NRBC # FLD: 0 K/UL — SIGNIFICANT CHANGE UP (ref 0–0)
NRBC BLD AUTO-RTO: 0 /100 WBCS — SIGNIFICANT CHANGE UP (ref 0–0)
PLATELET # BLD AUTO: 249 K/UL — SIGNIFICANT CHANGE UP (ref 150–400)
PMV BLD: 10.9 FL — SIGNIFICANT CHANGE UP (ref 7–13)
POTASSIUM SERPL-MCNC: 4.1 MMOL/L — SIGNIFICANT CHANGE UP (ref 3.5–5.3)
POTASSIUM SERPL-SCNC: 4.1 MMOL/L — SIGNIFICANT CHANGE UP (ref 3.5–5.3)
PROT SERPL-MCNC: 6.7 G/DL — SIGNIFICANT CHANGE UP (ref 6.6–8.7)
RBC # BLD: 4.09 M/UL — SIGNIFICANT CHANGE UP (ref 3.8–5.2)
RBC # FLD: 12.9 % — SIGNIFICANT CHANGE UP (ref 10.3–14.5)
SODIUM SERPL-SCNC: 141 MMOL/L — SIGNIFICANT CHANGE UP (ref 135–145)
TRIGL SERPL-MCNC: 83 MG/DL — SIGNIFICANT CHANGE UP
WBC # BLD: 6.77 K/UL — SIGNIFICANT CHANGE UP (ref 3.8–10.5)
WBC # FLD AUTO: 6.77 K/UL — SIGNIFICANT CHANGE UP (ref 3.8–10.5)

## 2025-04-03 PROCEDURE — C1769: CPT

## 2025-04-03 PROCEDURE — 83735 ASSAY OF MAGNESIUM: CPT

## 2025-04-03 PROCEDURE — 92978 ENDOLUMINL IVUS OCT C 1ST: CPT | Mod: RC

## 2025-04-03 PROCEDURE — C1725: CPT

## 2025-04-03 PROCEDURE — 36415 COLL VENOUS BLD VENIPUNCTURE: CPT

## 2025-04-03 PROCEDURE — 80048 BASIC METABOLIC PNL TOTAL CA: CPT

## 2025-04-03 PROCEDURE — C1887: CPT

## 2025-04-03 PROCEDURE — 85027 COMPLETE CBC AUTOMATED: CPT

## 2025-04-03 PROCEDURE — 80061 LIPID PANEL: CPT

## 2025-04-03 PROCEDURE — 93010 ELECTROCARDIOGRAM REPORT: CPT

## 2025-04-03 PROCEDURE — C9600: CPT | Mod: RC

## 2025-04-03 PROCEDURE — C1874: CPT

## 2025-04-03 PROCEDURE — 93458 L HRT ARTERY/VENTRICLE ANGIO: CPT | Mod: 59

## 2025-04-03 PROCEDURE — 80053 COMPREHEN METABOLIC PANEL: CPT

## 2025-04-03 PROCEDURE — C1894: CPT

## 2025-04-03 PROCEDURE — 93005 ELECTROCARDIOGRAM TRACING: CPT

## 2025-04-03 PROCEDURE — C1761: CPT

## 2025-04-03 PROCEDURE — 92972 PERQ TRLUML CORONRY LITHOTRP: CPT

## 2025-04-03 PROCEDURE — C1753: CPT

## 2025-04-03 RX ORDER — CLOPIDOGREL BISULFATE 75 MG/1
1 TABLET, FILM COATED ORAL
Qty: 90 | Refills: 3
Start: 2025-04-03

## 2025-04-03 RX ORDER — ROSUVASTATIN CALCIUM 5 MG/1
1 TABLET, FILM COATED ORAL
Refills: 0 | DISCHARGE

## 2025-04-03 RX ADMIN — Medication 81 MILLIGRAM(S): at 10:17

## 2025-04-03 RX ADMIN — CLOPIDOGREL BISULFATE 75 MILLIGRAM(S): 75 TABLET, FILM COATED ORAL at 10:17

## 2025-04-03 NOTE — PROGRESS NOTE ADULT - SUBJECTIVE AND OBJECTIVE BOX
Department of Cardiology                                                                  [facility]/[place] [address] [address]                                                            Telephone: [phone]. Fax:[phone]                                                                                    Interventional Cardiology Progress note         Reason for follow up    Patient s/p C with PCI on 25    Evaluated the pt at bedside, pt sitting  in bed, in no distress, CP free     ROS  Neuro: No Dizziness, HA  CVS: No CP, palpitations  Pulm: No SOB, wheezing, stridor        	  MEDICATIONS:        acetaminophen     Tablet .. 650 milliGRAM(s) Oral every 6 hours PRN      rosuvastatin 10 milliGRAM(s) Oral at bedtime    aspirin  chewable 81 milliGRAM(s) Oral daily  chlorhexidine 4% Liquid 1 Application(s) Topical Once  clopidogrel Tablet 75 milliGRAM(s) Oral daily        PHYSICAL EXAM:    T(C): 36.8 (25 @ 07:42), Max: 36.8 (25 @ 20:52)  HR: 71 (25 @ 07:42) (68 - 82)  BP: 115/72 (25 @ 07:42) (109/67 - 133/85)  RR: 18 (25 @ 07:42) (14 - 20)  SpO2: 95% (25 @ 07:42) (94% - 98%)  Wt(kg): --    I&O's Summary      Daily Height in cm: 165.1 (2025 18:12)    Daily Weight in k.9 (2025 18:12)    Appearance: Normal	  HEENT:   Normal oral mucosa, PERRL, EOMI	  Lymphatic: No lymphadenopathy  Cardiovascular: Normal S1 S2, No JVD, No murmurs, No edema  Respiratory: Lungs clear to auscultation	  Psychiatry: A & O x 3, Mood & affect appropriate  Gastrointestinal:  Soft, Non-tender, + BS	  Skin: No rashes, No ecchymoses, No cyanosis  Neurologic: Non-focal  Extremities: Normal range of motion, No clubbing, cyanosis or edema  Vascular: Peripheral pulses palpable 2+ bilaterally    TELEMETRY:SR    ECG: SR, no acute ST/T changes     DIAGNOSTICS/PROCEDURES    < from: Cardiac Catheterization (25 @ 15:05) >    Cath Lab Report    Diagnostic Cardiologist:       Dick Ferrell MD   Interventional Cardiologist:   Dick Ferrell MD   Referring Physician:           Kyler Aguirre MD   Referring Physician:           Vishal Gupta     Procedures Performed   Procedures:               1.    Arterial Access - Right Radial     2.    Diagnostic Coronary Angiography   3.    Left Heart Cath   4.    IVUS   5.    PCI: Intravascular Lithotripsy   6.    PCI: SHY     Indications:                Cardiac: abnormal cardiovascular testing     Conclusions:   Normal LVEF     Mild disease of the Left Main, LAD and LCX   Significant stenosis of the mid and proximal RCA; Verified by IVUS and  treated with intravascular lithotripsy and SHY stent  Recommendations:   Aspirin and Plavix; Aggressive risk factor modification with diet,  exercise, and a goal LDL of less than 70.  Smoking cessation emphasized.   Acute complication:    No complications     < end of copied text >        LABS:	 	    CARDIAC MARKERS:                            12.2   6.77  )-----------( 249      ( 2025 04:57 )             36.5     04-03    141  |  106  |  16.4  ----------------------------<  118[H]  4.1   |  22.0  |  0.42[L]    Ca    8.9      2025 04:57  Mg     2.0     04-03    TPro  6.7  /  Alb  3.7  /  TBili  0.2[L]  /  DBili  x   /  AST  16  /  ALT  11  /  AlkPhos  58  04-03

## 2025-04-03 NOTE — PROGRESS NOTE ADULT - ASSESSMENT
Assessment      This is a 55 year old female who presented for cardiac evaluation for symptoms of progressive SINGH. TTE revealed normal EF 55%, trace VHD. CCTA notable for MVD, CAC 1306: DLM 25%, RI 30%, p LAD 40%, Diag 50%, OM 30%. prox RCA 40%, m/d RCA 60%. She is now referred for [facility] rule out obstructive CAD.     Now s/p LHC via RRA with Dr. Ferrell, tolerated procedure well. Pt arrived to recovery in NAD and HDS, access site stable, no bleed/hematoma, distal pulse +,   Intraprocedurally: prox/mid RCA 70% occluded treated with 2 SHY prox RCA Xience 3.5x28MM mid RCA 3.0x38MM  Medications: loading dose Plavix 600mg     RUE warm, perfusing, no bleeding or hematoma, distal pulses 2+, Nv status intact    PLAN     s/p LHC/Obstructive CAD//P PCI TO RCA    -ADMITTED for overnight stay  due to heavily calcified long RCA lesion   -post cardiac cath management per protocol  -radial precautions  -labs and EKG in am benign, EKG: SR. HR: 73 BPM, No acute ST/T changes   -NS 0.9% 250ml/hr x 1 bolus: post procedure MARTHA ppx   -continue current medical therapy including  -Dual anti platelet therapy with aspirin/ plavix, reinforced importance of strict adherence to DAPT   -statin therapy: crestor 10mg daily HS  -Lipids in range   -follow up outpt in 2 weeks with Cardiologist: Dr. Ferrell   -Lifestyle modifications discussed to reduce cardiovascular risk factors including weight reduction, smoking cessation, medication compliance, and routine follow up with Cardiologist to track your BMI, cholesterol, and glucose levels.   - cardiac rehab info provided/referral and communication to cardiac rehab completed     At the time of discharge patient was hemodynamically stable and amenable to all terms of discharge. The patient has received verbal instructions from myself regarding discharge plans.     Length of Discharge: 45MIN    Patient is medically stable and cleared for discharge to home with outpatient follow up.

## 2025-04-03 NOTE — DISCHARGE NOTE NURSING/CASE MANAGEMENT/SOCIAL WORK - FINANCIAL ASSISTANCE
[facility] provides services at a reduced cost to those who are determined to be eligible through [facility]’s financial assistance program. Information regarding [facility]’s financial assistance program can be found by going to [email or web address] or by calling [phone].

## 2025-04-03 NOTE — DISCHARGE NOTE NURSING/CASE MANAGEMENT/SOCIAL WORK - PATIENT PORTAL LINK FT
You can access the FollowMyHealth Patient Portal offered by [facility] by registering at the following website: [email or web address]. By joining [facility]’s FollowMyHealth portal, you will also be able to view your health information using other applications (apps) compatible with our system. 167.64